# Patient Record
Sex: MALE | Race: WHITE | Employment: FULL TIME | URBAN - METROPOLITAN AREA
[De-identification: names, ages, dates, MRNs, and addresses within clinical notes are randomized per-mention and may not be internally consistent; named-entity substitution may affect disease eponyms.]

---

## 2018-01-29 PROBLEM — R03.0 ELEVATED BP WITHOUT DIAGNOSIS OF HYPERTENSION: Status: ACTIVE | Noted: 2018-01-29

## 2018-10-10 ENCOUNTER — NURSE ONLY (OUTPATIENT)
Dept: FAMILY MEDICINE CLINIC | Age: 43
End: 2018-10-10
Payer: COMMERCIAL

## 2018-10-10 DIAGNOSIS — Z23 NEED FOR TDAP VACCINATION: Primary | ICD-10-CM

## 2018-10-10 PROCEDURE — 90715 TDAP VACCINE 7 YRS/> IM: CPT | Performed by: FAMILY MEDICINE

## 2018-10-10 PROCEDURE — 90471 IMMUNIZATION ADMIN: CPT | Performed by: FAMILY MEDICINE

## 2019-03-04 ENCOUNTER — HOSPITAL ENCOUNTER (OUTPATIENT)
Age: 44
Discharge: HOME | End: 2019-03-06
Payer: COMMERCIAL

## 2019-03-04 ENCOUNTER — OFFICE VISIT (OUTPATIENT)
Dept: FAMILY MEDICINE CLINIC | Age: 44
End: 2019-03-04
Payer: COMMERCIAL

## 2019-03-04 VITALS
TEMPERATURE: 99.1 F | RESPIRATION RATE: 16 BRPM | HEIGHT: 69 IN | WEIGHT: 218.2 LBS | SYSTOLIC BLOOD PRESSURE: 139 MMHG | BODY MASS INDEX: 32.32 KG/M2 | HEART RATE: 73 BPM | OXYGEN SATURATION: 98 % | DIASTOLIC BLOOD PRESSURE: 87 MMHG

## 2019-03-04 DIAGNOSIS — R07.9 CHEST PAIN, UNSPECIFIED TYPE: ICD-10-CM

## 2019-03-04 DIAGNOSIS — Z00.00 ANNUAL PHYSICAL EXAM: Primary | ICD-10-CM

## 2019-03-04 DIAGNOSIS — M1A.09X0 IDIOPATHIC CHRONIC GOUT OF MULTIPLE SITES WITHOUT TOPHUS: ICD-10-CM

## 2019-03-04 DIAGNOSIS — Z00.00 ANNUAL PHYSICAL EXAM: ICD-10-CM

## 2019-03-04 LAB
ALBUMIN SERPL-MCNC: 4.6 G/DL (ref 3.5–5.2)
ALP BLD-CCNC: 75 U/L (ref 40–129)
ALT SERPL-CCNC: 18 U/L (ref 0–40)
ANION GAP SERPL CALCULATED.3IONS-SCNC: 14 MMOL/L (ref 7–16)
AST SERPL-CCNC: 22 U/L (ref 0–39)
BASOPHILS ABSOLUTE: 0.04 E9/L (ref 0–0.2)
BASOPHILS RELATIVE PERCENT: 0.7 % (ref 0–2)
BILIRUB SERPL-MCNC: 0.3 MG/DL (ref 0–1.2)
BUN BLDV-MCNC: 13 MG/DL (ref 6–20)
CALCIUM SERPL-MCNC: 9.3 MG/DL (ref 8.6–10.2)
CHLORIDE BLD-SCNC: 98 MMOL/L (ref 98–107)
CHOLESTEROL, TOTAL: 224 MG/DL (ref 0–199)
CO2: 25 MMOL/L (ref 22–29)
CREAT SERPL-MCNC: 0.9 MG/DL (ref 0.7–1.2)
EOSINOPHILS ABSOLUTE: 0.1 E9/L (ref 0.05–0.5)
EOSINOPHILS RELATIVE PERCENT: 1.8 % (ref 0–6)
GFR AFRICAN AMERICAN: >60
GFR NON-AFRICAN AMERICAN: >60 ML/MIN/1.73
GLUCOSE BLD-MCNC: 87 MG/DL (ref 74–99)
HCT VFR BLD CALC: 44.1 % (ref 37–54)
HDLC SERPL-MCNC: 60 MG/DL
HEMOGLOBIN: 14.2 G/DL (ref 12.5–16.5)
IMMATURE GRANULOCYTES #: 0.02 E9/L
IMMATURE GRANULOCYTES %: 0.4 % (ref 0–5)
LDL CHOLESTEROL CALCULATED: 138 MG/DL (ref 0–99)
LYMPHOCYTES ABSOLUTE: 1.39 E9/L (ref 1.5–4)
LYMPHOCYTES RELATIVE PERCENT: 25.4 % (ref 20–42)
MCH RBC QN AUTO: 31.1 PG (ref 26–35)
MCHC RBC AUTO-ENTMCNC: 32.2 % (ref 32–34.5)
MCV RBC AUTO: 96.7 FL (ref 80–99.9)
MONOCYTES ABSOLUTE: 0.4 E9/L (ref 0.1–0.95)
MONOCYTES RELATIVE PERCENT: 7.3 % (ref 2–12)
NEUTROPHILS ABSOLUTE: 3.53 E9/L (ref 1.8–7.3)
NEUTROPHILS RELATIVE PERCENT: 64.4 % (ref 43–80)
PDW BLD-RTO: 13 FL (ref 11.5–15)
PLATELET # BLD: 169 E9/L (ref 130–450)
PMV BLD AUTO: 11.6 FL (ref 7–12)
POTASSIUM SERPL-SCNC: 4.4 MMOL/L (ref 3.5–5)
RBC # BLD: 4.56 E12/L (ref 3.8–5.8)
SODIUM BLD-SCNC: 137 MMOL/L (ref 132–146)
TOTAL PROTEIN: 7.8 G/DL (ref 6.4–8.3)
TRIGL SERPL-MCNC: 131 MG/DL (ref 0–149)
URIC ACID, SERUM: 9 MG/DL (ref 3.4–7)
VITAMIN D 25-HYDROXY: 29 NG/ML (ref 30–100)
VLDLC SERPL CALC-MCNC: 26 MG/DL
WBC # BLD: 5.5 E9/L (ref 4.5–11.5)

## 2019-03-04 PROCEDURE — 82306 VITAMIN D 25 HYDROXY: CPT

## 2019-03-04 PROCEDURE — 99396 PREV VISIT EST AGE 40-64: CPT | Performed by: FAMILY MEDICINE

## 2019-03-04 PROCEDURE — 85025 COMPLETE CBC W/AUTO DIFF WBC: CPT

## 2019-03-04 PROCEDURE — 80061 LIPID PANEL: CPT

## 2019-03-04 PROCEDURE — 80053 COMPREHEN METABOLIC PANEL: CPT

## 2019-03-04 PROCEDURE — 93000 ELECTROCARDIOGRAM COMPLETE: CPT | Performed by: FAMILY MEDICINE

## 2019-03-04 PROCEDURE — 84550 ASSAY OF BLOOD/URIC ACID: CPT

## 2019-03-04 RX ORDER — ALLOPURINOL 100 MG/1
100 TABLET ORAL DAILY
Qty: 90 TABLET | Refills: 1 | Status: SHIPPED
Start: 2019-03-04 | End: 2020-11-05

## 2019-03-04 RX ORDER — PREDNISONE 20 MG/1
20 TABLET ORAL 2 TIMES DAILY
Qty: 10 TABLET | Refills: 0 | Status: SHIPPED | OUTPATIENT
Start: 2019-03-04 | End: 2019-03-09

## 2019-03-04 ASSESSMENT — PATIENT HEALTH QUESTIONNAIRE - PHQ9
SUM OF ALL RESPONSES TO PHQ9 QUESTIONS 1 & 2: 0
SUM OF ALL RESPONSES TO PHQ QUESTIONS 1-9: 0
1. LITTLE INTEREST OR PLEASURE IN DOING THINGS: 0
SUM OF ALL RESPONSES TO PHQ QUESTIONS 1-9: 0
2. FEELING DOWN, DEPRESSED OR HOPELESS: 0

## 2020-11-05 ENCOUNTER — OFFICE VISIT (OUTPATIENT)
Dept: FAMILY MEDICINE CLINIC | Age: 45
End: 2020-11-05
Payer: COMMERCIAL

## 2020-11-05 VITALS
HEART RATE: 73 BPM | BODY MASS INDEX: 33.33 KG/M2 | RESPIRATION RATE: 16 BRPM | WEIGHT: 225 LBS | OXYGEN SATURATION: 99 % | HEIGHT: 69 IN | TEMPERATURE: 97.6 F | SYSTOLIC BLOOD PRESSURE: 137 MMHG | DIASTOLIC BLOOD PRESSURE: 92 MMHG

## 2020-11-05 DIAGNOSIS — Z00.00 ANNUAL PHYSICAL EXAM: ICD-10-CM

## 2020-11-05 DIAGNOSIS — M1A.00X0 IDIOPATHIC CHRONIC GOUT WITHOUT TOPHUS, UNSPECIFIED SITE: ICD-10-CM

## 2020-11-05 PROCEDURE — 99396 PREV VISIT EST AGE 40-64: CPT | Performed by: FAMILY MEDICINE

## 2020-11-05 PROCEDURE — 90471 IMMUNIZATION ADMIN: CPT | Performed by: FAMILY MEDICINE

## 2020-11-05 PROCEDURE — 90686 IIV4 VACC NO PRSV 0.5 ML IM: CPT | Performed by: FAMILY MEDICINE

## 2020-11-05 SDOH — ECONOMIC STABILITY: TRANSPORTATION INSECURITY
IN THE PAST 12 MONTHS, HAS THE LACK OF TRANSPORTATION KEPT YOU FROM MEDICAL APPOINTMENTS OR FROM GETTING MEDICATIONS?: NO

## 2020-11-05 SDOH — ECONOMIC STABILITY: FOOD INSECURITY: WITHIN THE PAST 12 MONTHS, YOU WORRIED THAT YOUR FOOD WOULD RUN OUT BEFORE YOU GOT MONEY TO BUY MORE.: NEVER TRUE

## 2020-11-05 SDOH — ECONOMIC STABILITY: TRANSPORTATION INSECURITY
IN THE PAST 12 MONTHS, HAS LACK OF TRANSPORTATION KEPT YOU FROM MEETINGS, WORK, OR FROM GETTING THINGS NEEDED FOR DAILY LIVING?: NO

## 2020-11-05 SDOH — ECONOMIC STABILITY: INCOME INSECURITY: HOW HARD IS IT FOR YOU TO PAY FOR THE VERY BASICS LIKE FOOD, HOUSING, MEDICAL CARE, AND HEATING?: NOT HARD AT ALL

## 2020-11-05 ASSESSMENT — PATIENT HEALTH QUESTIONNAIRE - PHQ9
2. FEELING DOWN, DEPRESSED OR HOPELESS: 0
SUM OF ALL RESPONSES TO PHQ9 QUESTIONS 1 & 2: 0
SUM OF ALL RESPONSES TO PHQ QUESTIONS 1-9: 0
SUM OF ALL RESPONSES TO PHQ QUESTIONS 1-9: 0
1. LITTLE INTEREST OR PLEASURE IN DOING THINGS: 0
SUM OF ALL RESPONSES TO PHQ QUESTIONS 1-9: 0

## 2020-11-05 NOTE — PROGRESS NOTES
Annual exam:  Patient is here for routine yearly physical/preventative visit. Patient has no complaints or concerns today. Patient is  generally healthy. Chronic medical conditions are generally controlled. Health maintenance reviewed with patient and is  up to date. Overall doing well.       Past Medical History:   Diagnosis Date    Gout 1998      Past Surgical History:   Procedure Laterality Date    FINGER SURGERY Left 09/15/2017    left ring finger mass excision    LUMBAR FUSION      bone graft also--[facility] Dr. Zechariah Chang        Family History   Problem Relation Age of Onset    Heart Disease Father         massive MI    Cancer Maternal Grandmother         breast and lung    Cancer Maternal Grandfather     Diabetes Paternal Grandmother     Cancer Paternal Grandmother         breast    Heart Disease Paternal Grandfather     Heart Disease Paternal Aunt 72         of massive MI    Diabetes Paternal Aunt     Cancer Maternal Uncle         colon     Social History     Socioeconomic History    Marital status:      Spouse name: Not on file    Number of children: Not on file    Years of education: Not on file    Highest education level: Not on file   Occupational History    Not on file   Social Needs    Financial resource strain: Not on file    Food insecurity     Worry: Not on file     Inability: Not on file    Transportation needs     Medical: Not on file     Non-medical: Not on file   Tobacco Use    Smoking status: Former Smoker     Years: 10.00     Types: Cigarettes     Start date: 2000    Smokeless tobacco: Former User   Substance and Sexual Activity    Alcohol use: Yes     Comment: 2-3 drinks daily    Drug use: No    Sexual activity: Not on file   Lifestyle    Physical activity     Days per week: Not on file     Minutes per session: Not on file    Stress: Not on file   Relationships    Social connections     Talks on phone: Not on file Gets together: Not on file     Attends Buddhist service: Not on file     Active member of club or organization: Not on file     Attends meetings of clubs or organizations: Not on file     Relationship status: Not on file    Intimate partner violence     Fear of current or ex partner: Not on file     Emotionally abused: Not on file     Physically abused: Not on file     Forced sexual activity: Not on file   Other Topics Concern    Not on file   Social History Narrative    Not on file      No Known Allergies     Review of Systems:  Constitutional:  No fever, no fatigue, no chills, no headaches, no weight change  Dermatology:  No rash, no mole, no dry or sensitive skin  ENT:  No cough, no sore throat, no sinus pain, no runny nose, no ear pain  Cardiology:  No chest pain, no palpitations, no leg edema, no shortness of breath, no PND  Endocrinology:  No polydipsia, no polyuria, no cold intolerance, no heat intolerance, no polyphagia, no hair changes  Gastroenterology:  No dysphagia, no abdominal pain, no nausea, no vomiting, no constipation, no diarrhea, no heartburn  Female Reproductive:  No hot flashes, no abnormal vaginal discharge, no pain with menstruation, no pelvic pain  Musculoskeletal:  No joint pain, no leg cramps, no back pain, no muscle aches  Respiratory:  No shortness of breath, no orthopnea, no wheezing, no SANCHEZ, no hemoptysis  Urology:  No blood in the urine, no urinary frequency, no urinary incontinence, no urinary urgency, no nocturia, no dysuria  Neurology:  No numbness/tingling, no dizziness, no weakness  Psychology:  No depression, no sleep disturbances, no suicidal ideation, no anxiety  Physical Exam:  Vitals:    11/05/20 0834 11/05/20 0840   BP: (!) 142/86 (!) 137/92   Pulse: 73    Resp: 16    Temp: 97.6 °F (36.4 °C)    TempSrc: Temporal    SpO2: 99%    Weight: 225 lb (102.1 kg)    Height: 5' 9\" (1.753 m)      General:  Patient alert and oriented x 3, NAD, pleasant  HEENT:  Atraumatic, normocephalic, PERRLA, EOMI, clear conjunctiva, TMs clear, nose-clear, throat - no erythema, tonsils- wnl  Neck:  Supple, no goiter, no carotid bruits, no lymphadenopathy  Lungs:  CTA B  Heart:  RRR, no murmurs, gallops or rubs  Abdomen:  Soft, NTND, + bowel sounds  Back: full ROM, no CVA tenderness  Extremities:  No clubbing, cyanosis or edema  Neuro:  CN II-XII grossly intact, 5/5 strength in bilateral upper and lower extremities, 2 + reflexes. Skin: unremarkable    Assessment/Plan:  Fidel Ibarra was seen today for annual exam.    Diagnoses and all orders for this visit:    Annual physical exam  -     Comprehensive Metabolic Panel; Future  -     CBC Auto Differential; Future  -     Lipid Panel; Future  -     TSH without Reflex; Future  -     Vitamin D 25 Hydroxy; Future    Idiopathic chronic gout without tophus, unspecified site  -     Uric Acid; Future    Other orders  -     INFLUENZA, QUADV, 3 YRS AND OLDER, IM PF, PREFILL SYR OR SDV, 0.5ML (AFLURIA QUADV, PF)      As above. Call or go to ED immediately if symptoms worsen or persist.  No follow-ups on file. or sooner if necessary. Educational materials and/or home exercises printed for patient's review and were included in patient instructions on his/her After Visit Summary and given to patient at the end of visit. Counseled regarding above diagnosis, including possible risks and complications,  especially if left uncontrolled. Counseled regarding the possible side effects, risks, benefits and alternatives to treatment; patient and/or guardian verbalizes understanding, agrees, feels comfortable with and wishes to proceed with above treatment plan. Advised patient to call with any new medication issues, and read all Rx info from pharmacy to assure aware of all possible risks and side effects of medication before taking. Reviewed age and gender appropriate health screening exams and vaccinations.   Advised patient regarding importance of keeping up with recommended health maintenance and to schedule as soon as possible if overdue, as this is important in assessing for undiagnosed pathology, especially cancer, as well as protecting against potentially harmful/life threatening disease. Patient and/or guardian verbalizes understanding and agrees with above counseling, assessment and plan. All questions answered. Jalil Masterson MD  11/5/2020    I have personally reviewed and updated the chief complaint, HPI, Past Medical, Family and Social History, as well as the above Review of Systems.

## 2020-11-06 LAB
ALBUMIN SERPL-MCNC: 4.4 G/DL (ref 3.5–5.2)
ALP BLD-CCNC: 74 U/L (ref 40–129)
ALT SERPL-CCNC: 12 U/L (ref 0–40)
ANION GAP SERPL CALCULATED.3IONS-SCNC: 13 MMOL/L (ref 7–16)
AST SERPL-CCNC: 17 U/L (ref 0–39)
BASOPHILS ABSOLUTE: 0.03 E9/L (ref 0–0.2)
BASOPHILS RELATIVE PERCENT: 0.6 % (ref 0–2)
BILIRUB SERPL-MCNC: 0.3 MG/DL (ref 0–1.2)
BUN BLDV-MCNC: 11 MG/DL (ref 6–20)
CALCIUM SERPL-MCNC: 9.2 MG/DL (ref 8.6–10.2)
CHLORIDE BLD-SCNC: 102 MMOL/L (ref 98–107)
CHOLESTEROL, TOTAL: 208 MG/DL (ref 0–199)
CO2: 24 MMOL/L (ref 22–29)
CREAT SERPL-MCNC: 0.9 MG/DL (ref 0.7–1.2)
EOSINOPHILS ABSOLUTE: 0.07 E9/L (ref 0.05–0.5)
EOSINOPHILS RELATIVE PERCENT: 1.3 % (ref 0–6)
GFR AFRICAN AMERICAN: >60
GFR NON-AFRICAN AMERICAN: >60 ML/MIN/1.73
GLUCOSE BLD-MCNC: 103 MG/DL (ref 74–99)
HCT VFR BLD CALC: 45.4 % (ref 37–54)
HDLC SERPL-MCNC: 54 MG/DL
HEMOGLOBIN: 14.1 G/DL (ref 12.5–16.5)
IMMATURE GRANULOCYTES #: 0.03 E9/L
IMMATURE GRANULOCYTES %: 0.6 % (ref 0–5)
LDL CHOLESTEROL CALCULATED: 140 MG/DL (ref 0–99)
LYMPHOCYTES ABSOLUTE: 1.26 E9/L (ref 1.5–4)
LYMPHOCYTES RELATIVE PERCENT: 23.5 % (ref 20–42)
MCH RBC QN AUTO: 31.5 PG (ref 26–35)
MCHC RBC AUTO-ENTMCNC: 31.1 % (ref 32–34.5)
MCV RBC AUTO: 101.3 FL (ref 80–99.9)
MONOCYTES ABSOLUTE: 0.39 E9/L (ref 0.1–0.95)
MONOCYTES RELATIVE PERCENT: 7.3 % (ref 2–12)
NEUTROPHILS ABSOLUTE: 3.59 E9/L (ref 1.8–7.3)
NEUTROPHILS RELATIVE PERCENT: 66.7 % (ref 43–80)
PDW BLD-RTO: 13.4 FL (ref 11.5–15)
PLATELET # BLD: 164 E9/L (ref 130–450)
PMV BLD AUTO: 11.3 FL (ref 7–12)
POTASSIUM SERPL-SCNC: 4.6 MMOL/L (ref 3.5–5)
RBC # BLD: 4.48 E12/L (ref 3.8–5.8)
SODIUM BLD-SCNC: 139 MMOL/L (ref 132–146)
TOTAL PROTEIN: 7.4 G/DL (ref 6.4–8.3)
TRIGL SERPL-MCNC: 71 MG/DL (ref 0–149)
TSH SERPL DL<=0.05 MIU/L-ACNC: 1.92 UIU/ML (ref 0.27–4.2)
URIC ACID, SERUM: 8.4 MG/DL (ref 3.4–7)
VITAMIN D 25-HYDROXY: 21 NG/ML (ref 30–100)
VLDLC SERPL CALC-MCNC: 14 MG/DL
WBC # BLD: 5.4 E9/L (ref 4.5–11.5)

## 2020-12-04 RX ORDER — PREDNISONE 10 MG/1
TABLET ORAL
Qty: 20 TABLET | Refills: 1 | Status: SHIPPED
Start: 2020-12-04 | End: 2021-05-07

## 2020-12-07 ENCOUNTER — TELEPHONE (OUTPATIENT)
Dept: FAMILY MEDICINE CLINIC | Age: 45
End: 2020-12-07

## 2020-12-08 RX ORDER — ALLOPURINOL 100 MG/1
100 TABLET ORAL DAILY
Qty: 90 TABLET | Refills: 1 | Status: SHIPPED
Start: 2020-12-08 | End: 2021-01-26 | Stop reason: SDUPTHER

## 2020-12-08 NOTE — TELEPHONE ENCOUNTER
Left message notifying patient that Rx was sent to pharmacy and advising to start while taking Prednisone

## 2021-01-21 ENCOUNTER — TELEPHONE (OUTPATIENT)
Dept: ADMINISTRATIVE | Age: 46
End: 2021-01-21

## 2021-01-21 NOTE — TELEPHONE ENCOUNTER
Pt states he left a message on the clinical staff voicemail in the office, but it cut him off, so he wanted to make sure you got the whole message. This is his third flare up of gout in his knee in the past 6-8 weeks. He would like some blood work or an appt to figure out why he is getting flare up's so often. Next in office appt is not until the 28th. Please contact pt with further instructions/appt.

## 2021-01-26 ENCOUNTER — VIRTUAL VISIT (OUTPATIENT)
Dept: FAMILY MEDICINE CLINIC | Age: 46
End: 2021-01-26
Payer: COMMERCIAL

## 2021-01-26 DIAGNOSIS — M1A.00X0 IDIOPATHIC CHRONIC GOUT WITHOUT TOPHUS, UNSPECIFIED SITE: Primary | ICD-10-CM

## 2021-01-26 PROCEDURE — 99213 OFFICE O/P EST LOW 20 MIN: CPT | Performed by: FAMILY MEDICINE

## 2021-01-26 RX ORDER — ALLOPURINOL 300 MG/1
300 TABLET ORAL DAILY
Qty: 90 TABLET | Refills: 1 | Status: SHIPPED
Start: 2021-01-26 | End: 2021-05-07 | Stop reason: SDUPTHER

## 2021-01-26 ASSESSMENT — PATIENT HEALTH QUESTIONNAIRE - PHQ9
SUM OF ALL RESPONSES TO PHQ9 QUESTIONS 1 & 2: 0
1. LITTLE INTEREST OR PLEASURE IN DOING THINGS: 0
SUM OF ALL RESPONSES TO PHQ QUESTIONS 1-9: 0

## 2021-01-26 NOTE — PROGRESS NOTES
disturbance, weight gain or weight loss  Psychological ROS: negative for - anxiety, behavioral disorder, depression, hallucinations, irritability, memory difficulties, mood swings, sleep disturbances or suicidal ideation  ENT ROS: negative for - epistaxis, headaches, hearing change, nasal congestion, nasal discharge, nasal polyps, sinus pain, tinnitus, vertigo or visual changes  Hematological and Lymphatic ROS: negative for - bleeding problems, blood clots, fatigue or swollen lymph nodes  Respiratory ROS: negative for - cough, orthopnea, shortness of breath, sputum changes, tachypnea or wheezing  Cardiovascular ROS: negative for - chest pain, dyspnea on exertion, irregular heartbeat, loss of consciousness, palpitations, paroxysmal nocturnal dyspnea or rapid heart rate  Gastrointestinal ROS: negative for - abdominal pain, blood in stools, change in bowel habits, constipation, diarrhea, gas/bloating, heartburn or nausea/vomiting  Musculoskeletal ROS: negative for - joint pain, joint stiffness, joint swelling or muscle, back pain, bowel or bladder incontinence  Neurological ROS: negative for - behavioral changes, confusion, dizziness, headaches, memory loss, numbness/tingling, seizures or speech problems, weakness  Dermatological ROS: negative for - dry skin, mole changes, nail changes, pruritus, rash or skin lesion changes    Prior to Visit Medications    Medication Sig Taking?  Authorizing Provider   allopurinol (ZYLOPRIM) 300 MG tablet Take 1 tablet by mouth daily Yes Tamar Wang MD   predniSONE (DELTASONE) 10 MG tablet 4 tabs x 2 days then 3 tabs x 2 days then 2 tabs x 2 days then 1 tab x 2 days Yes Clarisse Law MD       Social History     Tobacco Use    Smoking status: Former Smoker     Years: 10.00     Types: Cigarettes     Start date: 9/27/2000    Smokeless tobacco: Former User   Substance Use Topics    Alcohol use: Yes     Comment: 2-3 drinks daily    Drug use: No        No Known Allergies,   Past Medical History:   Diagnosis Date    Gout    ,   Past Surgical History:   Procedure Laterality Date    FINGER SURGERY Left 09/15/2017    left ring finger mass excision    LUMBAR FUSION      bone graft also--[facility] Dr. Sonia Gabriel  2005   ,   Social History     Tobacco Use    Smoking status: Former Smoker     Years: 10.00     Types: Cigarettes     Start date: 2000    Smokeless tobacco: Former User   Substance Use Topics    Alcohol use: Yes     Comment: 2-3 drinks daily    Drug use: No   ,   Family History   Problem Relation Age of Onset    Heart Disease Father         massive MI    Cancer Maternal Grandmother         breast and lung    Cancer Maternal Grandfather     Diabetes Paternal Grandmother     Cancer Paternal Grandmother         breast    Heart Disease Paternal Grandfather     Heart Disease Paternal Aunt 79         of massive MI    Diabetes Paternal Aunt     Cancer Maternal Uncle         colon   ,   Immunization History   Administered Date(s) Administered    Influenza, Quadv, IM, PF (6 mo and older Fluzone, Flulaval, Fluarix, and 3 yrs and older Afluria) 2020    Tdap (Boostrix, Adacel) 10/10/2018   ,   Health Maintenance   Topic Date Due    Hepatitis C screen  1975    HIV screen  1990    A1C test (Diabetic or Prediabetic)  2017    Lipid screen  2025    DTaP/Tdap/Td vaccine (2 - Td) 10/10/2028    Flu vaccine  Completed    Hepatitis A vaccine  Aged Out    Hepatitis B vaccine  Aged Out    Hib vaccine  Aged Out    Meningococcal (ACWY) vaccine  Aged Out    Pneumococcal 0-64 years Vaccine  Aged Out       PHYSICAL EXAMINATION:  [ INSTRUCTIONS:  \"[x]\" Indicates a positive item  \"[]\" Indicates a negative item  -- DELETE ALL ITEMS NOT EXAMINED]  Vital Signs: (As obtained by patient/caregiver or practitioner observation)    Blood pressure-  Heart rate-    Respiratory rate-    Temperature-  Pulse oximetry-     Constitutional: [x] Appears well-developed and well-nourished [] No apparent distress      [x] Abnormal-   Mental status  [x] Alert and awake  [x] Oriented to person/place/time [x]Able to follow commands      Eyes:  EOM    [x]  Normal  [] Abnormal-  Sclera  [x]  Normal  [] Abnormal -         Discharge [x]  None visible  [] Abnormal -    HENT:   [x] Normocephalic, atraumatic. [] Abnormal   [x] Mouth/Throat: Mucous membranes are moist.     External Ears [x] Normal  [] Abnormal-     Neck: [x] No visualized mass     Pulmonary/Chest: [x] Respiratory effort normal.  [x] No visualized signs of difficulty breathing or respiratory distress        [] Abnormal-      Musculoskeletal:   [] Normal gait with no signs of ataxia         [x] Normal range of motion of neck        [] Abnormal-       Neurological:        [x] No Facial Asymmetry (Cranial nerve 7 motor function) (limited exam to video visit)          [] No gaze palsy        [] Abnormal-         Skin:        [x] No significant exanthematous lesions or discoloration noted on facial skin         [] Abnormal-            Psychiatric:       [x] Normal Affect [x] No Hallucinations        [] Abnormal-       Other pertinent observable physical exam findings-     Due to this being a TeleHealth encounter, evaluation of the following organ systems is limited: Vitals/Constitutional/EENT/Resp/CV/GI//MS/Neuro/Skin/Heme-Lymph-Imm. ASSESSMENT/PLAN:  Delores Freeman was seen today for gout. Diagnoses and all orders for this visit:    Idiopathic chronic gout without tophus, unspecified site  -     Uric Acid; Future    Other orders  -     allopurinol (ZYLOPRIM) 300 MG tablet; Take 1 tablet by mouth daily        No follow-ups on file. An  electronic signature was used to authenticate this note.     --Janette Neville MD on 1/26/2021 at 2:38 }    Pursuant to the emergency declaration under the [facility] waiver authority and the [facility] and Response Supplemental Appropriations Act, this Virtual  Visit was conducted, with patient's consent, to reduce the patient's risk of exposure to COVID-19 and provide continuity of care for an established patient. Services were provided through a video synchronous discussion virtually to substitute for in-person clinic visit.

## 2021-02-16 ENCOUNTER — NURSE ONLY (OUTPATIENT)
Dept: FAMILY MEDICINE CLINIC | Age: 46
End: 2021-02-16
Payer: COMMERCIAL

## 2021-02-16 DIAGNOSIS — E78.1 HYPERTRIGLYCERIDEMIA: ICD-10-CM

## 2021-02-16 DIAGNOSIS — M1A.00X0 IDIOPATHIC CHRONIC GOUT WITHOUT TOPHUS, UNSPECIFIED SITE: ICD-10-CM

## 2021-02-16 DIAGNOSIS — R73.01 IMPAIRED FASTING GLUCOSE: ICD-10-CM

## 2021-02-16 LAB — URIC ACID, SERUM: 6.8 MG/DL (ref 3.4–7)

## 2021-02-16 PROCEDURE — 36415 COLL VENOUS BLD VENIPUNCTURE: CPT | Performed by: FAMILY MEDICINE

## 2021-02-17 RX ORDER — ALLOPURINOL 100 MG/1
100 TABLET ORAL DAILY
Qty: 30 TABLET | Refills: 1 | Status: SHIPPED
Start: 2021-02-17 | End: 2021-05-07 | Stop reason: SDUPTHER

## 2021-05-04 ENCOUNTER — NURSE ONLY (OUTPATIENT)
Dept: FAMILY MEDICINE CLINIC | Age: 46
End: 2021-05-04
Payer: COMMERCIAL

## 2021-05-04 DIAGNOSIS — E78.1 HYPERTRIGLYCERIDEMIA: ICD-10-CM

## 2021-05-04 DIAGNOSIS — R73.01 IMPAIRED FASTING GLUCOSE: Primary | ICD-10-CM

## 2021-05-04 LAB
ALBUMIN SERPL-MCNC: 4.2 G/DL (ref 3.5–5.2)
ALP BLD-CCNC: 65 U/L (ref 40–129)
ALT SERPL-CCNC: 10 U/L (ref 0–40)
ANION GAP SERPL CALCULATED.3IONS-SCNC: 11 MMOL/L (ref 7–16)
AST SERPL-CCNC: 19 U/L (ref 0–39)
BASOPHILS ABSOLUTE: 0.03 E9/L (ref 0–0.2)
BASOPHILS RELATIVE PERCENT: 0.6 % (ref 0–2)
BILIRUB SERPL-MCNC: 0.4 MG/DL (ref 0–1.2)
BUN BLDV-MCNC: 12 MG/DL (ref 6–20)
CALCIUM SERPL-MCNC: 9.2 MG/DL (ref 8.6–10.2)
CHLORIDE BLD-SCNC: 104 MMOL/L (ref 98–107)
CHOLESTEROL, TOTAL: 189 MG/DL (ref 0–199)
CO2: 25 MMOL/L (ref 22–29)
CREAT SERPL-MCNC: 0.9 MG/DL (ref 0.7–1.2)
EOSINOPHILS ABSOLUTE: 0.1 E9/L (ref 0.05–0.5)
EOSINOPHILS RELATIVE PERCENT: 2 % (ref 0–6)
GFR AFRICAN AMERICAN: >60
GFR NON-AFRICAN AMERICAN: >60 ML/MIN/1.73
GLUCOSE BLD-MCNC: 109 MG/DL (ref 74–99)
HCT VFR BLD CALC: 42.9 % (ref 37–54)
HDLC SERPL-MCNC: 50 MG/DL
HEMOGLOBIN: 13.7 G/DL (ref 12.5–16.5)
IMMATURE GRANULOCYTES #: 0.03 E9/L
IMMATURE GRANULOCYTES %: 0.6 % (ref 0–5)
LDL CHOLESTEROL CALCULATED: 102 MG/DL (ref 0–99)
LYMPHOCYTES ABSOLUTE: 1.31 E9/L (ref 1.5–4)
LYMPHOCYTES RELATIVE PERCENT: 25.8 % (ref 20–42)
MCH RBC QN AUTO: 31.9 PG (ref 26–35)
MCHC RBC AUTO-ENTMCNC: 31.9 % (ref 32–34.5)
MCV RBC AUTO: 99.8 FL (ref 80–99.9)
MONOCYTES ABSOLUTE: 0.42 E9/L (ref 0.1–0.95)
MONOCYTES RELATIVE PERCENT: 8.3 % (ref 2–12)
NEUTROPHILS ABSOLUTE: 3.18 E9/L (ref 1.8–7.3)
NEUTROPHILS RELATIVE PERCENT: 62.7 % (ref 43–80)
PDW BLD-RTO: 13.9 FL (ref 11.5–15)
PLATELET # BLD: 148 E9/L (ref 130–450)
PMV BLD AUTO: 11.6 FL (ref 7–12)
POTASSIUM SERPL-SCNC: 4.2 MMOL/L (ref 3.5–5)
RBC # BLD: 4.3 E12/L (ref 3.8–5.8)
SODIUM BLD-SCNC: 140 MMOL/L (ref 132–146)
TOTAL PROTEIN: 6.9 G/DL (ref 6.4–8.3)
TRIGL SERPL-MCNC: 185 MG/DL (ref 0–149)
TSH SERPL DL<=0.05 MIU/L-ACNC: 2.19 UIU/ML (ref 0.27–4.2)
VLDLC SERPL CALC-MCNC: 37 MG/DL
WBC # BLD: 5.1 E9/L (ref 4.5–11.5)

## 2021-05-04 PROCEDURE — 36415 COLL VENOUS BLD VENIPUNCTURE: CPT | Performed by: FAMILY MEDICINE

## 2021-05-07 ENCOUNTER — OFFICE VISIT (OUTPATIENT)
Dept: FAMILY MEDICINE CLINIC | Age: 46
End: 2021-05-07
Payer: COMMERCIAL

## 2021-05-07 VITALS
SYSTOLIC BLOOD PRESSURE: 127 MMHG | RESPIRATION RATE: 16 BRPM | DIASTOLIC BLOOD PRESSURE: 83 MMHG | WEIGHT: 216.4 LBS | HEART RATE: 62 BPM | TEMPERATURE: 97.2 F | HEIGHT: 69 IN | OXYGEN SATURATION: 98 % | BODY MASS INDEX: 32.05 KG/M2

## 2021-05-07 DIAGNOSIS — E78.1 HYPERTRIGLYCERIDEMIA: ICD-10-CM

## 2021-05-07 DIAGNOSIS — R73.01 IMPAIRED FASTING GLUCOSE: ICD-10-CM

## 2021-05-07 DIAGNOSIS — M1A.00X0 IDIOPATHIC CHRONIC GOUT WITHOUT TOPHUS, UNSPECIFIED SITE: Primary | ICD-10-CM

## 2021-05-07 LAB
HBA1C MFR BLD: 5.3 %
URIC ACID, SERUM: 4.1 MG/DL (ref 3.4–7)

## 2021-05-07 PROCEDURE — 83036 HEMOGLOBIN GLYCOSYLATED A1C: CPT | Performed by: FAMILY MEDICINE

## 2021-05-07 PROCEDURE — 99214 OFFICE O/P EST MOD 30 MIN: CPT | Performed by: FAMILY MEDICINE

## 2021-05-07 RX ORDER — ALLOPURINOL 300 MG/1
300 TABLET ORAL DAILY
Qty: 90 TABLET | Refills: 3 | Status: SHIPPED
Start: 2021-05-07 | End: 2022-09-22

## 2021-05-07 RX ORDER — ALLOPURINOL 100 MG/1
200 TABLET ORAL DAILY
Qty: 180 TABLET | Refills: 3 | Status: SHIPPED
Start: 2021-05-07 | End: 2022-09-22

## 2021-05-07 NOTE — PROGRESS NOTES
Chief Complaint   Patient presents with    6 Month Follow-Up    Discuss Labs       HPI:  Patient is here for 6 month follow-up and to discuss lab work. Patient complains of still getting flare ups of gout. Hyperlipidemia:  Patient is here to follow up regarding chronic hyperlipidemia. This is  generally controlled. Treatment includes diet. Patient is  compliant with lifestyle modifications. Patient is not a smoker. Most recent labs reviewed with patient today and are not remarkable. Comorbid conditions include obesity but has lost 15 lbs with diet modification. BP is better as well. Lab Results   Component Value Date    LDLCALC 102 (H) 05/04/2021       Patient's past medical, surgical, social and/or family history reviewed, updated in chart, and are non-contributory (unless otherwise stated). Medications and allergies also reviewed and updated in chart. Patient's past medical, surgical, social and/or family history reviewed, updated in chart, and are non-contributory (unless otherwise stated). Medications and allergies also reviewed and updated in chart.     Review of Systems:  Constitutional:  No fever, no fatigue, no chills, no headaches, no weight change  Dermatology:  No rash, no mole, no dry or sensitive skin  ENT:  No cough, no sore throat, no sinus pain, no runny nose, no ear pain  Cardiology:  No chest pain, no palpitations, no leg edema, no shortness of breath, no PND  Gastroenterology:  No dysphagia, no abdominal pain, no nausea, no vomiting, no constipation, no diarrhea, no heartburn  Musculoskeletal:  No joint pain, no leg cramps, no back pain, no muscle aches  Respiratory:  No shortness of breath, no orthopnea, no wheezing, no SANCHEZ, no hemoptysis  Urology:  No blood in the urine, no urinary frequency, no urinary incontinence, no urinary urgency, no nocturia, no dysuria  Vitals:    05/07/21 0807   BP: 127/83   Pulse: 62   Resp: 16   Temp: 97.2 °F (36.2 °C)   TempSrc: to schedule as soon as possible if overdue, as this is important in assessing for undiagnosed pathology, especially cancer, as well as protecting against potentially harmful/life threatening disease. Patient and/or guardian verbalizes understanding and agrees with above counseling, assessment and plan. All questions answered. Yunior Terry MD  5/7/2021    I have personally reviewed and updated the chief complaint, HPI, Past Medical, Family and Social History, as well as the above Review of Systems.

## 2021-06-30 ENCOUNTER — NURSE ONLY (OUTPATIENT)
Dept: PRIMARY CARE CLINIC | Age: 46
End: 2021-06-30
Payer: COMMERCIAL

## 2021-06-30 DIAGNOSIS — Z11.52 ENCOUNTER FOR SCREENING FOR COVID-19: Primary | ICD-10-CM

## 2021-06-30 LAB
Lab: NORMAL
PERFORMING INSTRUMENT: NORMAL
QC PASS/FAIL: NORMAL
SARS-COV-2, POC: NORMAL

## 2021-06-30 PROCEDURE — 87426 SARSCOV CORONAVIRUS AG IA: CPT | Performed by: NURSE PRACTITIONER

## 2021-09-17 ENCOUNTER — HOSPITAL ENCOUNTER (EMERGENCY)
Age: 46
Discharge: HOME | End: 2021-09-18
Attending: STUDENT IN AN ORGANIZED HEALTH CARE EDUCATION/TRAINING PROGRAM
Payer: COMMERCIAL

## 2021-09-17 ENCOUNTER — APPOINTMENT (OUTPATIENT)
Dept: GENERAL RADIOLOGY | Age: 46
End: 2021-09-17
Payer: COMMERCIAL

## 2021-09-17 DIAGNOSIS — R07.89 ATYPICAL CHEST PAIN: Primary | ICD-10-CM

## 2021-09-17 DIAGNOSIS — R10.13 DYSPEPSIA: ICD-10-CM

## 2021-09-17 LAB
ALBUMIN SERPL-MCNC: 4.3 G/DL (ref 3.5–5.2)
ALP BLD-CCNC: 81 U/L (ref 40–129)
ALT SERPL-CCNC: 15 U/L (ref 0–40)
ANION GAP SERPL CALCULATED.3IONS-SCNC: 12 MMOL/L (ref 7–16)
AST SERPL-CCNC: 17 U/L (ref 0–39)
BASOPHILS ABSOLUTE: 0.03 E9/L (ref 0–0.2)
BASOPHILS RELATIVE PERCENT: 0.5 % (ref 0–2)
BILIRUB SERPL-MCNC: 0.4 MG/DL (ref 0–1.2)
BUN BLDV-MCNC: 11 MG/DL (ref 6–20)
CALCIUM SERPL-MCNC: 9.4 MG/DL (ref 8.6–10.2)
CHLORIDE BLD-SCNC: 104 MMOL/L (ref 98–107)
CO2: 24 MMOL/L (ref 22–29)
CREAT SERPL-MCNC: 1 MG/DL (ref 0.7–1.2)
EOSINOPHILS ABSOLUTE: 0.05 E9/L (ref 0.05–0.5)
EOSINOPHILS RELATIVE PERCENT: 0.8 % (ref 0–6)
GFR AFRICAN AMERICAN: >60
GFR NON-AFRICAN AMERICAN: >60 ML/MIN/1.73
GLUCOSE BLD-MCNC: 108 MG/DL (ref 74–99)
HCT VFR BLD CALC: 38.6 % (ref 37–54)
HEMOGLOBIN: 13.3 G/DL (ref 12.5–16.5)
IMMATURE GRANULOCYTES #: 0.04 E9/L
IMMATURE GRANULOCYTES %: 0.6 % (ref 0–5)
LYMPHOCYTES ABSOLUTE: 1.56 E9/L (ref 1.5–4)
LYMPHOCYTES RELATIVE PERCENT: 25.3 % (ref 20–42)
MCH RBC QN AUTO: 32.6 PG (ref 26–35)
MCHC RBC AUTO-ENTMCNC: 34.5 % (ref 32–34.5)
MCV RBC AUTO: 94.6 FL (ref 80–99.9)
MONOCYTES ABSOLUTE: 0.58 E9/L (ref 0.1–0.95)
MONOCYTES RELATIVE PERCENT: 9.4 % (ref 2–12)
NEUTROPHILS ABSOLUTE: 3.91 E9/L (ref 1.8–7.3)
NEUTROPHILS RELATIVE PERCENT: 63.4 % (ref 43–80)
PDW BLD-RTO: 12.8 FL (ref 11.5–15)
PLATELET # BLD: 145 E9/L (ref 130–450)
PMV BLD AUTO: 10.8 FL (ref 7–12)
POTASSIUM REFLEX MAGNESIUM: 3.8 MMOL/L (ref 3.5–5)
RBC # BLD: 4.08 E12/L (ref 3.8–5.8)
SODIUM BLD-SCNC: 140 MMOL/L (ref 132–146)
TOTAL PROTEIN: 7.4 G/DL (ref 6.4–8.3)
TROPONIN, HIGH SENSITIVITY: <6 NG/L (ref 0–11)
WBC # BLD: 6.2 E9/L (ref 4.5–11.5)

## 2021-09-17 PROCEDURE — 85025 COMPLETE CBC W/AUTO DIFF WBC: CPT

## 2021-09-17 PROCEDURE — 99284 EMERGENCY DEPT VISIT MOD MDM: CPT

## 2021-09-17 PROCEDURE — 84484 ASSAY OF TROPONIN QUANT: CPT

## 2021-09-17 PROCEDURE — 93005 ELECTROCARDIOGRAM TRACING: CPT | Performed by: PHYSICIAN ASSISTANT

## 2021-09-17 PROCEDURE — 80053 COMPREHEN METABOLIC PANEL: CPT

## 2021-09-17 PROCEDURE — 71046 X-RAY EXAM CHEST 2 VIEWS: CPT

## 2021-09-17 RX ORDER — ASPIRIN 81 MG/1
81 TABLET, CHEWABLE ORAL ONCE
Status: COMPLETED | OUTPATIENT
Start: 2021-09-18 | End: 2021-09-18

## 2021-09-17 ASSESSMENT — PAIN SCALES - GENERAL: PAINLEVEL_OUTOF10: 0

## 2021-09-17 ASSESSMENT — PAIN DESCRIPTION - FREQUENCY: FREQUENCY: INTERMITTENT

## 2021-09-17 NOTE — ED NOTES
[address] [facility] CONTACT ASSESSMENT NOTE                                                                                                Department of Emergency Medicine                                                      First Provider Note  21  6:08 PM EDT  NAME: Sarah Beth Harman  : 1975  MRN: [id number]    Chief Complaint: Chest Pain (Radiates Neck/Jaw and Back x3 Days) and Shortness of Breath      History of Present Illness:   Sarah Beth Harman is a 39 y.o. male who presents to the ED for Chest pain      Focused Physical Exam:  VS:    ED Triage Vitals   BP Temp Temp Source Pulse Resp SpO2 Height Weight   21 1805 21 1805 21 1805 21 1805 21 1803 21 1803 21 1803 09/17/21 1803   (!) 161/95 97 °F (36.1 °C) Temporal 102 18 97 % 5' 9\" (1.753 m) 215 lb (97.5 kg)        General: Alert and in no apparent distress. Heart rrr   Lungs clear     Medical History:  has a past medical history of Gout. Surgical History:  has a past surgical history that includes lumbar fusion (); Vasectomy (); and Finger surgery (Left, 09/15/2017). Social History:  reports that he has quit smoking. His smoking use included cigarettes. He started smoking about 20 years ago. He quit after 10.00 years of use. He has quit using smokeless tobacco. He reports current alcohol use. He reports that he does not use drugs. Family History: family history includes Cancer in his maternal grandfather, maternal grandmother, maternal uncle, and paternal grandmother; Diabetes in his paternal aunt and paternal grandmother; Heart Disease in his father and paternal grandfather; Heart Disease (age of onset: 79) in his paternal aunt. Allergies: Patient has no known allergies.      Initial Plan of Care:  Initiate Treatment-Testing, Proceed toTreatment Area When Bed Available for ED Attending/MLP to Continue Care    -------------------------------------------------END OF FIRST PROVIDER CONTACT ASSESSMENT NOTE--------------------------------------------------------  Electronically signed by AZUCENA Carnes   DD: 9/17/21     AZUCENA Carnes  09/17/21 3083

## 2021-09-18 VITALS
HEIGHT: 69 IN | DIASTOLIC BLOOD PRESSURE: 83 MMHG | HEART RATE: 64 BPM | WEIGHT: 215 LBS | OXYGEN SATURATION: 98 % | SYSTOLIC BLOOD PRESSURE: 124 MMHG | BODY MASS INDEX: 31.84 KG/M2 | RESPIRATION RATE: 16 BRPM | TEMPERATURE: 97 F

## 2021-09-18 LAB
D DIMER: <200 NG/ML DDU
EKG ATRIAL RATE: 95 BPM
EKG P AXIS: 52 DEGREES
EKG P-R INTERVAL: 164 MS
EKG Q-T INTERVAL: 352 MS
EKG QRS DURATION: 108 MS
EKG QTC CALCULATION (BAZETT): 442 MS
EKG R AXIS: 80 DEGREES
EKG T AXIS: 16 DEGREES
EKG VENTRICULAR RATE: 95 BPM
TROPONIN, HIGH SENSITIVITY: <6 NG/L (ref 0–11)

## 2021-09-18 PROCEDURE — 84484 ASSAY OF TROPONIN QUANT: CPT

## 2021-09-18 PROCEDURE — 93010 ELECTROCARDIOGRAM REPORT: CPT | Performed by: INTERNAL MEDICINE

## 2021-09-18 PROCEDURE — 85378 FIBRIN DEGRADE SEMIQUANT: CPT

## 2021-09-18 PROCEDURE — 6370000000 HC RX 637 (ALT 250 FOR IP): Performed by: STUDENT IN AN ORGANIZED HEALTH CARE EDUCATION/TRAINING PROGRAM

## 2021-09-18 RX ORDER — FAMOTIDINE 20 MG/1
20 TABLET, FILM COATED ORAL 2 TIMES DAILY
Qty: 20 TABLET | Refills: 0 | Status: SHIPPED | OUTPATIENT
Start: 2021-09-18 | End: 2022-09-22

## 2021-09-18 RX ADMIN — ASPIRIN 81 MG CHEWABLE TABLET 81 MG: 81 TABLET CHEWABLE at 00:24

## 2021-09-18 RX ADMIN — LIDOCAINE HYDROCHLORIDE: 20 SOLUTION ORAL; TOPICAL at 00:24

## 2021-09-18 ASSESSMENT — ENCOUNTER SYMPTOMS
EYE DISCHARGE: 0
BLOOD IN STOOL: 0
EYE ITCHING: 0
CHEST TIGHTNESS: 1
ABDOMINAL PAIN: 0
BACK PAIN: 0
SORE THROAT: 0
CONSTIPATION: 0
COUGH: 0
ABDOMINAL DISTENTION: 0
EYE PAIN: 0
SHORTNESS OF BREATH: 0
EYE REDNESS: 0
DIARRHEA: 0
NAUSEA: 0
FACIAL SWELLING: 0

## 2021-09-18 NOTE — ED PROVIDER NOTES
Name: Katelin Thayer   MRN: [id number]     --------------------------------------------- History of Present Illness ---------------------------------------------  9/17/21, Time: 11:48 PM EDT   Chief Complaint   Patient presents with    Chest Pain     Radiates Neck/Jaw and Back x3 Days    Shortness of Breath      HPI    Katelin Thayer is a 39 y.o. male, with hx of GERD, who presents to the ED today for chest pain, which began 3 days ago and has been constant and moderate in severity. The patient describes this as a tightness, squeezing feeling in his had some associated jaw pain. He states it is sometimes made worse with swallowing and he can sometimes feel it when he takes a deep breath. He states he gets some relief after burping. He took 2 aspirin today. He denies any cardiac history. He states he is a heavy coffee drinker. The pt denies any associated fever, HA, n/v, diaphoresis, lightheadedness, shortness of breath, abd pain, GI or  complaints}. Allg: Patient has no known allergies. PCP: Yulissa Llamas MD.    Meds: No current facility-administered medications for this encounter. Current Outpatient Medications:     famotidine (PEPCID) 20 MG tablet, Take 1 tablet by mouth 2 times daily for 10 days, Disp: 20 tablet, Rfl: 0    allopurinol (ZYLOPRIM) 100 MG tablet, Take 2 tablets by mouth daily, Disp: 180 tablet, Rfl: 3    allopurinol (ZYLOPRIM) 300 MG tablet, Take 1 tablet by mouth daily, Disp: 90 tablet, Rfl: 3     Review of Systems   Constitutional: Negative for chills and fever. HENT: Negative for facial swelling and sore throat. Eyes: Negative for pain, discharge, redness and itching. Respiratory: Positive for chest tightness. Negative for cough and shortness of breath. Cardiovascular: Positive for chest pain. Negative for palpitations. Gastrointestinal: Negative for abdominal distention, abdominal pain, blood in stool, constipation, diarrhea and nausea.    Endocrine: Negative for polyuria. Genitourinary: Negative for dysuria, flank pain, penile pain and testicular pain. Musculoskeletal: Negative for back pain and neck pain. Skin: Negative for pallor and rash. Neurological: Negative for syncope and headaches. Psychiatric/Behavioral: Negative for suicidal ideas. The patient is not nervous/anxious. Physical Exam  Vitals and nursing note reviewed. Constitutional:       General: He is not in acute distress. Appearance: He is normal weight. He is not ill-appearing, toxic-appearing or diaphoretic. HENT:      Head: Normocephalic and atraumatic. Right Ear: External ear normal.      Left Ear: External ear normal.      Nose: Nose normal.      Mouth/Throat:      Mouth: Mucous membranes are moist.      Pharynx: Oropharynx is clear. Eyes:      General: No scleral icterus. Pupils: Pupils are equal, round, and reactive to light. Cardiovascular:      Rate and Rhythm: Normal rate and regular rhythm. Pulses: No decreased pulses. Heart sounds: No murmur heard. No friction rub. No gallop. Pulmonary:      Effort: Pulmonary effort is normal. No respiratory distress. Breath sounds: Normal breath sounds. No stridor. No decreased breath sounds, wheezing, rhonchi or rales. Chest:      Chest wall: No deformity, tenderness, crepitus or edema. Abdominal:      Palpations: Abdomen is soft. Tenderness: There is no abdominal tenderness. There is no guarding or rebound. Musculoskeletal:         General: No tenderness or deformity. Normal range of motion. Cervical back: Normal range of motion. No tenderness. Right lower leg: No tenderness. No edema. Left lower leg: No tenderness. No edema. Skin:     General: Skin is warm and dry. Capillary Refill: Capillary refill takes less than 2 seconds. Findings: No erythema. Neurological:      General: No focal deficit present.       Mental Status: He is alert and oriented to person, place, [BB]      ED Course User Index  [BB] Avis Sage DO        ED Course as of Sep 18 0137   Sat Sep 18, 2021   0122 Received signout from Dr. Nixon Davis.    [BB]      ED Course User Index  [BB] Avis Sage DO       --------------------------------------------- PAST HISTORY ---------------------------------------------  Past Medical History:  has a past medical history of Gout. Past Surgical History:  has a past surgical history that includes lumbar fusion (1995); Vasectomy (2005); and Finger surgery (Left, 09/15/2017). Social History:  reports that he has quit smoking. His smoking use included cigarettes. He started smoking about 20 years ago. He quit after 10.00 years of use. He has quit using smokeless tobacco. He reports current alcohol use. He reports that he does not use drugs. Family History: family history includes Cancer in his maternal grandfather, maternal grandmother, maternal uncle, and paternal grandmother; Diabetes in his paternal aunt and paternal grandmother; Heart Disease in his father and paternal grandfather; Heart Disease (age of onset: 79) in his paternal aunt. The patients home medications have been reviewed. Allergies: Patient has no known allergies.     -------------------------------------------------- RESULTS -------------------------------------------------  Labs:  Results for orders placed or performed during the hospital encounter of 09/17/21   CBC Auto Differential   Result Value Ref Range    WBC 6.2 4.5 - 11.5 E9/L    RBC 4.08 3.80 - 5.80 E12/L    Hemoglobin 13.3 12.5 - 16.5 g/dL    Hematocrit 38.6 37.0 - 54.0 %    MCV 94.6 80.0 - 99.9 fL    MCH 32.6 26.0 - 35.0 pg    MCHC 34.5 32.0 - 34.5 %    RDW 12.8 11.5 - 15.0 fL    Platelets 062 637 - 131 E9/L    MPV 10.8 7.0 - 12.0 fL    Neutrophils % 63.4 43.0 - 80.0 %    Immature Granulocytes % 0.6 0.0 - 5.0 %    Lymphocytes % 25.3 20.0 - 42.0 %    Monocytes % 9.4 2.0 - 12.0 %    Eosinophils % 0.8 0.0 - 6.0 %    Basophils % 0.5 0.0 - 2.0 %    Neutrophils Absolute 3.91 1.80 - 7.30 E9/L    Immature Granulocytes # 0.04 E9/L    Lymphocytes Absolute 1.56 1.50 - 4.00 E9/L    Monocytes Absolute 0.58 0.10 - 0.95 E9/L    Eosinophils Absolute 0.05 0.05 - 0.50 E9/L    Basophils Absolute 0.03 0.00 - 0.20 E9/L   Comprehensive Metabolic Panel w/ Reflex to MG   Result Value Ref Range    Sodium 140 132 - 146 mmol/L    Potassium reflex Magnesium 3.8 3.5 - 5.0 mmol/L    Chloride 104 98 - 107 mmol/L    CO2 24 22 - 29 mmol/L    Anion Gap 12 7 - 16 mmol/L    Glucose 108 (H) 74 - 99 mg/dL    BUN 11 6 - 20 mg/dL    CREATININE 1.0 0.7 - 1.2 mg/dL    GFR Non-African American >60 >=60 mL/min/1.73    GFR African American >60     Calcium 9.4 8.6 - 10.2 mg/dL    Total Protein 7.4 6.4 - 8.3 g/dL    Albumin 4.3 3.5 - 5.2 g/dL    Total Bilirubin 0.4 0.0 - 1.2 mg/dL    Alkaline Phosphatase 81 40 - 129 U/L    ALT 15 0 - 40 U/L    AST 17 0 - 39 U/L   Troponin   Result Value Ref Range    Troponin, High Sensitivity <6 0 - 11 ng/L   Troponin   Result Value Ref Range    Troponin, High Sensitivity <6 0 - 11 ng/L   D-Dimer, Quantitative   Result Value Ref Range    D-Dimer, Quant <200 ng/mL DDU   EKG 12 Lead   Result Value Ref Range    Ventricular Rate 95 BPM    Atrial Rate 95 BPM    P-R Interval 164 ms    QRS Duration 108 ms    Q-T Interval 352 ms    QTc Calculation (Bazett) 442 ms    P Axis 52 degrees    R Axis 80 degrees    T Axis 16 degrees       Radiology:  XR CHEST (2 VW)   Final Result   Elevation of the right hemidiaphragm. No acute cardiopulmonary pathology. ------------------------- NURSING NOTES AND VITALS REVIEWED ---------------------------  Date / Time Roomed:  9/17/2021 11:26 PM  ED Bed Assignment:  22/22    The nursing notes within the ED encounter and vital signs as below have been reviewed.    /83   Pulse 74   Temp 97 °F (36.1 °C) (Temporal)   Resp 16   Ht 5' 9\" (1.753 m)   Wt 215 lb (97.5 kg)   SpO2 98%   BMI 31.75 kg/m²   Oxygen Saturation Interpretation: Normal      ------------------------------------------ PROGRESS NOTES ------------------------------------------  1:28 AM EDT  I have spoken with the patient and discussed todays results, in addition to providing specific details for the plan of care and counseling regarding the diagnosis and prognosis. Their questions are answered at this time and they are agreeable with the plan. I discussed at length with them reasons for immediate return here for re evaluation. They will followup with their PCP on Monday and to return to the ED for any new or worsening symptoms. --------------------------------- ADDITIONAL PROVIDER NOTES ---------------------------------  At this time the patient is without objective evidence of an acute process requiring hospitalization or inpatient management. They have remained hemodynamically stable throughout their entire ED visit and are stable for discharge with outpatient follow-up. The plan has been discussed in detail and they are aware of the specific conditions for emergent return, as well as the importance of follow-up. New Prescriptions    FAMOTIDINE (PEPCID) 20 MG TABLET    Take 1 tablet by mouth 2 times daily for 10 days       Diagnosis:  1. Atypical chest pain Improving   2. Dyspepsia Improving       Disposition:  Patient's disposition: Discharge to home  Patient's condition is stable.      Roro Murphy DO  Resident  09/18/21 2355

## 2021-10-19 DIAGNOSIS — Z12.11 ENCOUNTER FOR SCREENING COLONOSCOPY: Primary | ICD-10-CM

## 2022-02-14 ENCOUNTER — NURSE ONLY (OUTPATIENT)
Dept: PRIMARY CARE CLINIC | Age: 47
End: 2022-02-14

## 2022-02-14 DIAGNOSIS — Z20.822 CLOSE EXPOSURE TO COVID-19 VIRUS: Primary | ICD-10-CM

## 2022-02-14 DIAGNOSIS — Z20.822 CLOSE EXPOSURE TO COVID-19 VIRUS: ICD-10-CM

## 2022-02-15 LAB — SARS-COV-2, PCR: NOT DETECTED

## 2022-07-05 NOTE — PROGRESS NOTES
Tick exposure  Will treat with Doxycycline at his request for Lyme Prophylaxis. Doxycycline 200 mg x 1.   Called in to pharmacy

## 2022-09-22 ENCOUNTER — OFFICE VISIT (OUTPATIENT)
Dept: FAMILY MEDICINE CLINIC | Age: 47
End: 2022-09-22
Payer: COMMERCIAL

## 2022-09-22 VITALS
HEART RATE: 68 BPM | OXYGEN SATURATION: 98 % | TEMPERATURE: 97.2 F | BODY MASS INDEX: 31.18 KG/M2 | RESPIRATION RATE: 18 BRPM | DIASTOLIC BLOOD PRESSURE: 78 MMHG | HEIGHT: 69 IN | SYSTOLIC BLOOD PRESSURE: 124 MMHG | WEIGHT: 210.5 LBS

## 2022-09-22 DIAGNOSIS — Z00.00 ANNUAL PHYSICAL EXAM: Primary | ICD-10-CM

## 2022-09-22 DIAGNOSIS — M1A.00X0 IDIOPATHIC CHRONIC GOUT WITHOUT TOPHUS, UNSPECIFIED SITE: ICD-10-CM

## 2022-09-22 DIAGNOSIS — Z12.11 ENCOUNTER FOR SCREENING COLONOSCOPY: ICD-10-CM

## 2022-09-22 DIAGNOSIS — Z00.00 ANNUAL PHYSICAL EXAM: ICD-10-CM

## 2022-09-22 LAB
HCT VFR BLD CALC: 42.1 % (ref 37–54)
HEMOGLOBIN: 13.8 G/DL (ref 12.5–16.5)
MCH RBC QN AUTO: 32.8 PG (ref 26–35)
MCHC RBC AUTO-ENTMCNC: 32.8 % (ref 32–34.5)
MCV RBC AUTO: 100 FL (ref 80–99.9)
PDW BLD-RTO: 12.8 FL (ref 11.5–15)
PLATELET # BLD: 144 E9/L (ref 130–450)
PMV BLD AUTO: 11.2 FL (ref 7–12)
RBC # BLD: 4.21 E12/L (ref 3.8–5.8)
WBC # BLD: 5 E9/L (ref 4.5–11.5)

## 2022-09-22 PROCEDURE — 90471 IMMUNIZATION ADMIN: CPT | Performed by: FAMILY MEDICINE

## 2022-09-22 PROCEDURE — 90674 CCIIV4 VAC NO PRSV 0.5 ML IM: CPT | Performed by: FAMILY MEDICINE

## 2022-09-22 PROCEDURE — 99396 PREV VISIT EST AGE 40-64: CPT | Performed by: FAMILY MEDICINE

## 2022-09-22 RX ORDER — EPINEPHRINE 0.3 MG/.3ML
0.3 INJECTION SUBCUTANEOUS
Qty: 1 EACH | Refills: 1 | Status: SHIPPED | OUTPATIENT
Start: 2022-09-22 | End: 2022-09-22

## 2022-09-22 SDOH — ECONOMIC STABILITY: FOOD INSECURITY: WITHIN THE PAST 12 MONTHS, THE FOOD YOU BOUGHT JUST DIDN'T LAST AND YOU DIDN'T HAVE MONEY TO GET MORE.: NEVER TRUE

## 2022-09-22 SDOH — ECONOMIC STABILITY: FOOD INSECURITY: WITHIN THE PAST 12 MONTHS, YOU WORRIED THAT YOUR FOOD WOULD RUN OUT BEFORE YOU GOT MONEY TO BUY MORE.: NEVER TRUE

## 2022-09-22 ASSESSMENT — PATIENT HEALTH QUESTIONNAIRE - PHQ9
SUM OF ALL RESPONSES TO PHQ QUESTIONS 1-9: 0
SUM OF ALL RESPONSES TO PHQ9 QUESTIONS 1 & 2: 0
SUM OF ALL RESPONSES TO PHQ QUESTIONS 1-9: 0
SUM OF ALL RESPONSES TO PHQ QUESTIONS 1-9: 0
1. LITTLE INTEREST OR PLEASURE IN DOING THINGS: 0
2. FEELING DOWN, DEPRESSED OR HOPELESS: 0
SUM OF ALL RESPONSES TO PHQ QUESTIONS 1-9: 0

## 2022-09-22 ASSESSMENT — SOCIAL DETERMINANTS OF HEALTH (SDOH): HOW HARD IS IT FOR YOU TO PAY FOR THE VERY BASICS LIKE FOOD, HOUSING, MEDICAL CARE, AND HEATING?: NOT HARD AT ALL

## 2022-09-22 NOTE — PROGRESS NOTES
Annual exam:  Patient is here for routine yearly physical/preventative visit. Patient has complaints or concerns today. Pt would like to discuss possible allergy to avacados, bananas, and melons and cucumbers. Pt states he may need an epi pen. Pt would also like  To know he had covid in may with mild symptoms. Patient is  generally healthy. Chronic medical conditions are generally controlled. Most recent labs reviewed with patient and  are not remarkable. Health maintenance reviewed with patient and is not up to date. Overall doing well. Pt is fasting today. Last labs done 1 year ago. HM reviewed. Pt agreeable to flu vaccine. Pt is due for a colonoscopy and is agreeable to one.     Past Medical History:   Diagnosis Date    Gout       Past Surgical History:   Procedure Laterality Date    FINGER SURGERY Left 09/15/2017    left ring finger mass excision    LUMBAR FUSION      bone graft also--[facility] Dr. Nehemiah Tamayo        Family History   Problem Relation Age of Onset    Heart Disease Father         massive MI    Cancer Maternal Grandmother         breast and lung    Cancer Maternal Grandfather     Diabetes Paternal Grandmother     Cancer Paternal Grandmother         breast    Heart Disease Paternal Grandfather     Heart Disease Paternal Aunt 79         of massive MI    Diabetes Paternal Aunt     Cancer Maternal Uncle         colon     Social History     Socioeconomic History    Marital status:      Spouse name: Not on file    Number of children: Not on file    Years of education: Not on file    Highest education level: Not on file   Occupational History    Not on file   Tobacco Use    Smoking status: Former     Years: 10.00     Types: Cigarettes     Start date: 2000    Smokeless tobacco: Former   Substance and Sexual Activity    Alcohol use: Yes     Comment: 2-3 drinks daily    Drug use: No    Sexual activity: Not on file   Other Topics Concern    Not on file   Social History Narrative    Not on file     Social Determinants of Health     Financial Resource Strain: Low Risk     Difficulty of Paying Living Expenses: Not hard at all   Food Insecurity: No Food Insecurity    Worried About Running Out of Food in the Last Year: Never true    Ran Out of Food in the Last Year: Never true   Transportation Needs: Not on file   Physical Activity: Not on file   Stress: Not on file   Social Connections: Not on file   Intimate Partner Violence: Not on file   Housing Stability: Not on file      No Known Allergies     Review of Systems:  Constitutional:  No fever, no fatigue, no chills, no headaches, no weight change  Dermatology:  No rash, no mole, no dry or sensitive skin  ENT:  No cough, no sore throat, no sinus pain, no runny nose, no ear pain  Cardiology:  No chest pain, no palpitations, no leg edema, no shortness of breath, no PND  Endocrinology:  No polydipsia, no polyuria, no cold intolerance, no heat intolerance, no polyphagia, no hair changes  Gastroenterology:  No dysphagia, no abdominal pain, no nausea, no vomiting, no constipation, no diarrhea, no heartburn  Female Reproductive:  No hot flashes, no abnormal vaginal discharge, no pain with menstruation, no pelvic pain  Musculoskeletal:  No joint pain, no leg cramps, no back pain, no muscle aches  Respiratory:  No shortness of breath, no orthopnea, no wheezing, no SANCHEZ, no hemoptysis  Urology:  No blood in the urine, no urinary frequency, no urinary incontinence, no urinary urgency, no nocturia, no dysuria  Neurology:  No numbness/tingling, no dizziness, no weakness  Psychology:  No depression, no sleep disturbances, no suicidal ideation, no anxiety  Physical Exam:  Vitals:    09/22/22 1056 09/22/22 1058   BP: (!) 136/91 124/78   Pulse: 68    Resp: 18    Temp: 97.2 °F (36.2 °C)    TempSrc: Temporal    SpO2: 98%    Weight: 210 lb 8 oz (95.5 kg)    Height: 5' 9\" (1.753 m)      General:  Patient alert and oriented x 3, NAD, pleasant  HEENT:  Atraumatic, normocephalic, PERRLA, EOMI, clear conjunctiva, TMs clear, nose-clear, throat - no erythema, tonsils- wnl  Neck:  Supple, no goiter, no carotid bruits, no lymphadenopathy  Lungs:  CTA B  Heart:  RRR, no murmurs, gallops or rubs  Abdomen:  Soft, NTND, + bowel sounds  Back: full ROM, no CVA tenderness  Extremities:  No clubbing, cyanosis or edema  Neuro:  CN II-XII grossly intact, 5/5 strength in bilateral upper and lower extremities, 2 + reflexes. Skin: unremarkable    Assessment/Plan:  Koki Moralez was seen today for annual exam.    Diagnoses and all orders for this visit:    Annual physical exam  -     Comprehensive Metabolic Panel; Future  -     CBC; Future  -     Lipid Panel; Future  -     Hemoglobin A1C; Future    Idiopathic chronic gout without tophus, unspecified site  -     Uric Acid; Future    Encounter for screening colonoscopy  -     Prabhjot Nice MD, General Surgery, [place]    Other orders  -     Influenza, FLUCELVAX, (age 10 mo+), IM, Preservative Free, 0.5 mL  -     EPINEPHrine (EPIPEN 2-CHRISTELLE) 0.3 MG/0.3ML SOAJ injection; Inject 0.3 mLs into the muscle once as needed (allergic reaction)    As above. Call or go to ED immediately if symptoms worsen or persist.  No follow-ups on file. or sooner if necessary. Educational materials and/or home exercises printed for patient's review and were included in patient instructions on his/her After Visit Summary and given to patient at the end of visit. Counseled regarding above diagnosis, including possible risks and complications,  especially if left uncontrolled. Counseled regarding the possible side effects, risks, benefits and alternatives to treatment; patient and/or guardian verbalizes understanding, agrees, feels comfortable with and wishes to proceed with above treatment plan.     Advised patient to call with any new medication issues, and read all Rx info from pharmacy to assure aware of all possible risks and side effects of medication before taking. Reviewed age and gender appropriate health screening exams and vaccinations. Advised patient regarding importance of keeping up with recommended health maintenance and to schedule as soon as possible if overdue, as this is important in assessing for undiagnosed pathology, especially cancer, as well as protecting against potentially harmful/life threatening disease. Patient and/or guardian verbalizes understanding and agrees with above counseling, assessment and plan. All questions answered. Silva Brady MD  9/22/2022    I have personally reviewed and updated the chief complaint, HPI, Past Medical, Family and Social History, as well as the above Review of Systems.

## 2022-09-23 LAB
ALBUMIN SERPL-MCNC: 4.7 G/DL (ref 3.5–5.2)
ALP BLD-CCNC: 70 U/L (ref 40–129)
ALT SERPL-CCNC: 15 U/L (ref 0–40)
ANION GAP SERPL CALCULATED.3IONS-SCNC: 13 MMOL/L (ref 7–16)
AST SERPL-CCNC: 21 U/L (ref 0–39)
BILIRUB SERPL-MCNC: 0.5 MG/DL (ref 0–1.2)
BUN BLDV-MCNC: 12 MG/DL (ref 6–20)
CALCIUM SERPL-MCNC: 9.7 MG/DL (ref 8.6–10.2)
CHLORIDE BLD-SCNC: 102 MMOL/L (ref 98–107)
CHOLESTEROL, TOTAL: 246 MG/DL (ref 0–199)
CO2: 24 MMOL/L (ref 22–29)
CREAT SERPL-MCNC: 0.9 MG/DL (ref 0.7–1.2)
GFR AFRICAN AMERICAN: >60
GFR NON-AFRICAN AMERICAN: >60 ML/MIN/1.73
GLUCOSE BLD-MCNC: 92 MG/DL (ref 74–99)
HBA1C MFR BLD: 5.3 % (ref 4–5.6)
HDLC SERPL-MCNC: 54 MG/DL
LDL CHOLESTEROL CALCULATED: 147 MG/DL (ref 0–99)
POTASSIUM SERPL-SCNC: 4.5 MMOL/L (ref 3.5–5)
SODIUM BLD-SCNC: 139 MMOL/L (ref 132–146)
TOTAL PROTEIN: 7.8 G/DL (ref 6.4–8.3)
TRIGL SERPL-MCNC: 227 MG/DL (ref 0–149)
URIC ACID, SERUM: 8.5 MG/DL (ref 3.4–7)
VLDLC SERPL CALC-MCNC: 45 MG/DL

## 2022-09-26 DIAGNOSIS — M1A.00X0 IDIOPATHIC CHRONIC GOUT WITHOUT TOPHUS, UNSPECIFIED SITE: ICD-10-CM

## 2022-09-26 DIAGNOSIS — E78.1 HYPERTRIGLYCERIDEMIA: Primary | ICD-10-CM

## 2022-09-27 RX ORDER — ALLOPURINOL 100 MG/1
200 TABLET ORAL DAILY
Qty: 180 TABLET | Refills: 3 | Status: SHIPPED | OUTPATIENT
Start: 2022-09-27

## 2022-10-10 ENCOUNTER — TELEPHONE (OUTPATIENT)
Dept: SURGERY | Age: 47
End: 2022-10-10

## 2022-10-10 NOTE — TELEPHONE ENCOUNTER
FYI: cancelled 10/10/22 for screening colon consult/Dr Claudette Erichsen. Patient states will call back to reschedule. Nirmal Martins

## 2023-02-28 DIAGNOSIS — M1A.0790 IDIOPATHIC CHRONIC GOUT OF FOOT WITHOUT TOPHUS, UNSPECIFIED LATERALITY: Primary | ICD-10-CM

## 2023-02-28 RX ORDER — PREDNISONE 20 MG/1
TABLET ORAL
Qty: 10 TABLET | Refills: 0 | Status: SHIPPED | OUTPATIENT
Start: 2023-02-28

## 2023-02-28 NOTE — PROGRESS NOTES
Acute gout attack. Diagnoses and all orders for this visit:    Idiopathic chronic gout of foot without tophus, unspecified laterality  -     predniSONE (DELTASONE) 20 MG tablet; 20 mg tabs.   2 tabs PO QD X 3 DAYS, then 1 tabs PO QD X 3 DAYS, then 1/2 tab PO QD x 2 days

## 2023-11-14 ASSESSMENT — PATIENT HEALTH QUESTIONNAIRE - PHQ9
2. FEELING DOWN, DEPRESSED OR HOPELESS: NOT AT ALL
SUM OF ALL RESPONSES TO PHQ9 QUESTIONS 1 & 2: 0
2. FEELING DOWN, DEPRESSED OR HOPELESS: 0
SUM OF ALL RESPONSES TO PHQ QUESTIONS 1-9: 0
1. LITTLE INTEREST OR PLEASURE IN DOING THINGS: 0
SUM OF ALL RESPONSES TO PHQ QUESTIONS 1-9: 0
SUM OF ALL RESPONSES TO PHQ9 QUESTIONS 1 & 2: 0
1. LITTLE INTEREST OR PLEASURE IN DOING THINGS: NOT AT ALL

## 2023-11-17 ENCOUNTER — OFFICE VISIT (OUTPATIENT)
Dept: FAMILY MEDICINE CLINIC | Age: 48
End: 2023-11-17

## 2023-11-17 VITALS
TEMPERATURE: 97.5 F | WEIGHT: 217 LBS | BODY MASS INDEX: 32.14 KG/M2 | RESPIRATION RATE: 16 BRPM | HEART RATE: 81 BPM | SYSTOLIC BLOOD PRESSURE: 127 MMHG | DIASTOLIC BLOOD PRESSURE: 85 MMHG | OXYGEN SATURATION: 96 % | HEIGHT: 69 IN

## 2023-11-17 DIAGNOSIS — Z00.00 ANNUAL PHYSICAL EXAM: ICD-10-CM

## 2023-11-17 DIAGNOSIS — M1A.00X0 IDIOPATHIC CHRONIC GOUT WITHOUT TOPHUS, UNSPECIFIED SITE: ICD-10-CM

## 2023-11-17 DIAGNOSIS — Z00.00 ANNUAL PHYSICAL EXAM: Primary | ICD-10-CM

## 2023-11-17 DIAGNOSIS — Z12.11 SCREEN FOR COLON CANCER: ICD-10-CM

## 2023-11-17 LAB
ALBUMIN SERPL-MCNC: 4.7 G/DL (ref 3.5–5.2)
ALP BLD-CCNC: 74 U/L (ref 40–129)
ALT SERPL-CCNC: 14 U/L (ref 0–40)
ANION GAP SERPL CALCULATED.3IONS-SCNC: 18 MMOL/L (ref 7–16)
AST SERPL-CCNC: 21 U/L (ref 0–39)
BILIRUB SERPL-MCNC: 0.5 MG/DL (ref 0–1.2)
BUN BLDV-MCNC: 12 MG/DL (ref 6–20)
CALCIUM SERPL-MCNC: 9.5 MG/DL (ref 8.6–10.2)
CHLORIDE BLD-SCNC: 104 MMOL/L (ref 98–107)
CHOLESTEROL: 250 MG/DL
CO2: 22 MMOL/L (ref 22–29)
CREAT SERPL-MCNC: 0.9 MG/DL (ref 0.7–1.2)
GFR SERPL CREATININE-BSD FRML MDRD: >60 ML/MIN/1.73M2
GLUCOSE BLD-MCNC: 105 MG/DL (ref 74–99)
HDLC SERPL-MCNC: 57 MG/DL
LDL CHOLESTEROL: 152 MG/DL
POTASSIUM SERPL-SCNC: 4.8 MMOL/L (ref 3.5–5)
SODIUM BLD-SCNC: 144 MMOL/L (ref 132–146)
TOTAL PROTEIN: 7.8 G/DL (ref 6.4–8.3)
TRIGL SERPL-MCNC: 205 MG/DL
TSH SERPL DL<=0.05 MIU/L-ACNC: 2.12 UIU/ML (ref 0.27–4.2)
URIC ACID: 8.8 MG/DL (ref 3.4–7)
VLDLC SERPL CALC-MCNC: 41 MG/DL

## 2023-11-17 SDOH — ECONOMIC STABILITY: INCOME INSECURITY: HOW HARD IS IT FOR YOU TO PAY FOR THE VERY BASICS LIKE FOOD, HOUSING, MEDICAL CARE, AND HEATING?: NOT HARD AT ALL

## 2023-11-17 SDOH — ECONOMIC STABILITY: HOUSING INSECURITY
IN THE LAST 12 MONTHS, WAS THERE A TIME WHEN YOU DID NOT HAVE A STEADY PLACE TO SLEEP OR SLEPT IN A SHELTER (INCLUDING NOW)?: NO

## 2023-11-17 SDOH — ECONOMIC STABILITY: FOOD INSECURITY: WITHIN THE PAST 12 MONTHS, YOU WORRIED THAT YOUR FOOD WOULD RUN OUT BEFORE YOU GOT MONEY TO BUY MORE.: NEVER TRUE

## 2023-11-17 SDOH — ECONOMIC STABILITY: FOOD INSECURITY: WITHIN THE PAST 12 MONTHS, THE FOOD YOU BOUGHT JUST DIDN'T LAST AND YOU DIDN'T HAVE MONEY TO GET MORE.: NEVER TRUE

## 2023-11-18 LAB
HBA1C MFR BLD: 5.8 % (ref 4–5.6)
HCT VFR BLD CALC: 44.7 % (ref 37–54)
HEMOGLOBIN: 14.3 G/DL (ref 12.5–16.5)
MCH RBC QN AUTO: 32.2 PG (ref 26–35)
MCHC RBC AUTO-ENTMCNC: 32 G/DL (ref 32–34.5)
MCV RBC AUTO: 100.7 FL (ref 80–99.9)
PDW BLD-RTO: 13.1 % (ref 11.5–15)
PLATELET # BLD: 157 K/UL (ref 130–450)
PMV BLD AUTO: 11 FL (ref 7–12)
RBC # BLD: 4.44 M/UL (ref 3.8–5.8)
WBC # BLD: 6.7 K/UL (ref 4.5–11.5)

## 2023-11-22 RX ORDER — ALLOPURINOL 100 MG/1
200 TABLET ORAL DAILY
Qty: 180 TABLET | Refills: 3 | Status: SHIPPED | OUTPATIENT
Start: 2023-11-22

## 2024-05-14 ENCOUNTER — TELEPHONE (OUTPATIENT)
Dept: FAMILY MEDICINE CLINIC | Age: 49
End: 2024-05-14

## 2024-05-14 DIAGNOSIS — E78.1 HYPERTRIGLYCERIDEMIA: ICD-10-CM

## 2024-05-14 DIAGNOSIS — M1A.00X0 IDIOPATHIC CHRONIC GOUT WITHOUT TOPHUS, UNSPECIFIED SITE: Primary | ICD-10-CM

## 2024-05-14 DIAGNOSIS — R73.01 IMPAIRED FASTING GLUCOSE: ICD-10-CM

## 2024-06-06 DIAGNOSIS — M1A.00X0 IDIOPATHIC CHRONIC GOUT WITHOUT TOPHUS, UNSPECIFIED SITE: ICD-10-CM

## 2024-06-06 DIAGNOSIS — E78.1 HYPERTRIGLYCERIDEMIA: ICD-10-CM

## 2024-06-06 DIAGNOSIS — R73.01 IMPAIRED FASTING GLUCOSE: ICD-10-CM

## 2024-06-06 LAB
ALBUMIN: 4.7 G/DL (ref 3.5–5.2)
ALP BLD-CCNC: 62 U/L (ref 40–129)
ALT SERPL-CCNC: 19 U/L (ref 0–40)
ANION GAP SERPL CALCULATED.3IONS-SCNC: 15 MMOL/L (ref 7–16)
AST SERPL-CCNC: 22 U/L (ref 0–39)
BILIRUB SERPL-MCNC: 0.5 MG/DL (ref 0–1.2)
BUN BLDV-MCNC: 10 MG/DL (ref 6–20)
CALCIUM SERPL-MCNC: 9.5 MG/DL (ref 8.6–10.2)
CHLORIDE BLD-SCNC: 102 MMOL/L (ref 98–107)
CHOLESTEROL, TOTAL: 190 MG/DL
CO2: 23 MMOL/L (ref 22–29)
CREAT SERPL-MCNC: 0.8 MG/DL (ref 0.7–1.2)
GFR, ESTIMATED: >90 ML/MIN/1.73M2
GLUCOSE BLD-MCNC: 94 MG/DL (ref 74–99)
HBA1C MFR BLD: 4.9 % (ref 4–5.6)
HCT VFR BLD CALC: 43.4 % (ref 37–54)
HDLC SERPL-MCNC: 61 MG/DL
HEMOGLOBIN: 14.1 G/DL (ref 12.5–16.5)
LDL CHOLESTEROL: 116 MG/DL
MCH RBC QN AUTO: 32 PG (ref 26–35)
MCHC RBC AUTO-ENTMCNC: 32.5 G/DL (ref 32–34.5)
MCV RBC AUTO: 98.4 FL (ref 80–99.9)
PDW BLD-RTO: 13 % (ref 11.5–15)
PLATELET # BLD: 176 K/UL (ref 130–450)
PMV BLD AUTO: 11.1 FL (ref 7–12)
POTASSIUM SERPL-SCNC: 4.1 MMOL/L (ref 3.5–5)
RBC # BLD: 4.41 M/UL (ref 3.8–5.8)
SODIUM BLD-SCNC: 140 MMOL/L (ref 132–146)
TOTAL PROTEIN: 7.4 G/DL (ref 6.4–8.3)
TRIGL SERPL-MCNC: 66 MG/DL
TSH SERPL DL<=0.05 MIU/L-ACNC: 2.56 UIU/ML (ref 0.27–4.2)
URIC ACID: 7.1 MG/DL (ref 3.4–7)
VLDLC SERPL CALC-MCNC: 13 MG/DL
WBC # BLD: 5.1 K/UL (ref 4.5–11.5)

## 2024-06-12 ENCOUNTER — OFFICE VISIT (OUTPATIENT)
Dept: FAMILY MEDICINE CLINIC | Age: 49
End: 2024-06-12
Payer: COMMERCIAL

## 2024-06-12 VITALS
BODY MASS INDEX: 25.92 KG/M2 | WEIGHT: 175 LBS | RESPIRATION RATE: 16 BRPM | OXYGEN SATURATION: 98 % | HEIGHT: 69 IN | SYSTOLIC BLOOD PRESSURE: 112 MMHG | HEART RATE: 64 BPM | TEMPERATURE: 97.2 F | DIASTOLIC BLOOD PRESSURE: 70 MMHG

## 2024-06-12 DIAGNOSIS — E78.1 HYPERTRIGLYCERIDEMIA: ICD-10-CM

## 2024-06-12 DIAGNOSIS — M1A.00X0 IDIOPATHIC CHRONIC GOUT WITHOUT TOPHUS, UNSPECIFIED SITE: ICD-10-CM

## 2024-06-12 DIAGNOSIS — R73.01 IMPAIRED FASTING GLUCOSE: Primary | ICD-10-CM

## 2024-06-12 DIAGNOSIS — R63.4 RECENT WEIGHT LOSS: ICD-10-CM

## 2024-06-12 PROCEDURE — 99214 OFFICE O/P EST MOD 30 MIN: CPT | Performed by: FAMILY MEDICINE

## 2024-06-12 RX ORDER — EPINEPHRINE 0.3 MG/.3ML
0.3 INJECTION SUBCUTANEOUS
Qty: 1 EACH | Refills: 1 | Status: SHIPPED | OUTPATIENT
Start: 2024-06-12 | End: 2024-06-12

## 2024-06-12 ASSESSMENT — PATIENT HEALTH QUESTIONNAIRE - PHQ9
SUM OF ALL RESPONSES TO PHQ QUESTIONS 1-9: 0
SUM OF ALL RESPONSES TO PHQ9 QUESTIONS 1 & 2: 0
SUM OF ALL RESPONSES TO PHQ9 QUESTIONS 1 & 2: 0
1. LITTLE INTEREST OR PLEASURE IN DOING THINGS: NOT AT ALL
2. FEELING DOWN, DEPRESSED OR HOPELESS: NOT AT ALL
SUM OF ALL RESPONSES TO PHQ QUESTIONS 1-9: 0
1. LITTLE INTEREST OR PLEASURE IN DOING THINGS: NOT AT ALL
2. FEELING DOWN, DEPRESSED OR HOPELESS: NOT AT ALL
SUM OF ALL RESPONSES TO PHQ QUESTIONS 1-9: 0
SUM OF ALL RESPONSES TO PHQ QUESTIONS 1-9: 0

## 2024-06-12 NOTE — PROGRESS NOTES
Hyperlipidemia:  Patient is here to follow up regarding chronic hyperlipidemia.  This is  generally controlled.  Treatment includes diet.  Patient is  compliant with lifestyle modifications.  Patient is not a smoker.  Most recent labs reviewed with patient today and are not remarkable.  Comorbid conditions include gout.      Lab Results   Component Value Date     (H) 06/06/2024        Patient's past medical, surgical, social and/or family history reviewed, updated in chart, and are non-contributory (unless otherwise stated).  Medications and allergies also reviewed and updated in chart.      Review of Systems:  Constitutional:  No fever, no fatigue, no chills, no headaches, no weight change  Dermatology:  No rash, no mole, no dry or sensitive skin  ENT:  No cough, no sore throat, no sinus pain, no runny nose, no ear pain  Cardiology:  No chest pain, no palpitations, no leg edema, no shortness of breath, no PND  Gastroenterology:  No dysphagia, no abdominal pain, no nausea, no vomiting, no constipation, no diarrhea, no heartburn  Musculoskeletal:  No joint pain, no leg cramps, no back pain, no muscle aches  Respiratory:  No shortness of breath, no orthopnea, no wheezing, no SANCHEZ, no hemoptysis  Urology:  No blood in the urine, no urinary frequency, no urinary incontinence, no urinary urgency, no nocturia, no dysuria  Vitals:    06/12/24 0829   BP: 112/70   Pulse: 64   Resp: 16   Temp: 97.2 °F (36.2 °C)   SpO2: 98%   Weight: 79.4 kg (175 lb)   Height: 1.753 m (5' 9\")       General:  Patient alert and oriented x 3, NAD, pleasant  HEENT:  Atraumatic, normocephalic, PERRLA, EOMI, clear conjunctiva, TMs clear, nose-clear, throat - no erythema  Neck:  Supple, no goiter, no carotid bruits, no lymphadenopathy  Lungs:  CTA B  Heart:  RRR, no murmurs, gallops or rubs  Abdomen:  Soft/nt/nd, + bowel sounds  Extremities:  No clubbing, cyanosis or edema  Skin: unremarkable    Assessment/Plan:      Alex was seen today

## 2024-09-06 ENCOUNTER — OFFICE VISIT (OUTPATIENT)
Dept: SURGERY | Age: 49
End: 2024-09-06
Payer: COMMERCIAL

## 2024-09-06 ENCOUNTER — PREP FOR PROCEDURE (OUTPATIENT)
Dept: SURGERY | Age: 49
End: 2024-09-06

## 2024-09-06 ENCOUNTER — TELEPHONE (OUTPATIENT)
Dept: SURGERY | Age: 49
End: 2024-09-06

## 2024-09-06 VITALS
DIASTOLIC BLOOD PRESSURE: 76 MMHG | WEIGHT: 172 LBS | BODY MASS INDEX: 25.48 KG/M2 | HEIGHT: 69 IN | HEART RATE: 58 BPM | SYSTOLIC BLOOD PRESSURE: 126 MMHG | TEMPERATURE: 97.4 F

## 2024-09-06 DIAGNOSIS — Z12.11 COLON CANCER SCREENING: ICD-10-CM

## 2024-09-06 DIAGNOSIS — Z12.11 COLON CANCER SCREENING: Primary | ICD-10-CM

## 2024-09-06 PROCEDURE — 99202 OFFICE O/P NEW SF 15 MIN: CPT | Performed by: STUDENT IN AN ORGANIZED HEALTH CARE EDUCATION/TRAINING PROGRAM

## 2024-09-06 RX ORDER — BISACODYL 5 MG/1
10 TABLET, DELAYED RELEASE ORAL 2 TIMES DAILY
Qty: 4 TABLET | Refills: 0 | Status: SHIPPED | OUTPATIENT
Start: 2024-09-06 | End: 2024-09-07

## 2024-09-06 RX ORDER — SODIUM CHLORIDE 0.9 % (FLUSH) 0.9 %
5-40 SYRINGE (ML) INJECTION PRN
OUTPATIENT
Start: 2024-09-06

## 2024-09-06 RX ORDER — SODIUM CHLORIDE 9 MG/ML
INJECTION, SOLUTION INTRAVENOUS PRN
OUTPATIENT
Start: 2024-09-06

## 2024-09-06 RX ORDER — SODIUM CHLORIDE 9 MG/ML
INJECTION, SOLUTION INTRAVENOUS CONTINUOUS
OUTPATIENT
Start: 2024-09-06

## 2024-09-06 RX ORDER — POLYETHYLENE GLYCOL 3350, SODIUM SULFATE ANHYDROUS, SODIUM BICARBONATE, SODIUM CHLORIDE, POTASSIUM CHLORIDE 236; 22.74; 6.74; 5.86; 2.97 G/4L; G/4L; G/4L; G/4L; G/4L
4 POWDER, FOR SOLUTION ORAL ONCE
Qty: 4000 ML | Refills: 0 | Status: SHIPPED | OUTPATIENT
Start: 2024-09-06 | End: 2024-09-06

## 2024-09-06 RX ORDER — SODIUM CHLORIDE 0.9 % (FLUSH) 0.9 %
5-40 SYRINGE (ML) INJECTION EVERY 12 HOURS SCHEDULED
OUTPATIENT
Start: 2024-09-06

## 2024-09-06 NOTE — PROGRESS NOTES
[facility]  HISTORY & PHYSICAL    CC:  Colorectal cancer screening     HPI:     Alex Frye is here as New patient(9/6/2024). The patient was sent here to discuss colorectal cancer screening. The patient denies any weight loss, rectal bleeding, abdominal pain, or change in bowel habits.  There is no family history of IBD or colorectal cancer.  The patient has never had a screening colonoscopy.    Past Medical History:   Diagnosis Date    Gout 1998     Past Surgical History:   Procedure Laterality Date    FINGER SURGERY Left 09/15/2017    left ring finger mass excision    LUMBAR FUSION  1995    bone graft also--[facility] Dr. Gustafson    VASECTOMY  2005     Social History     Socioeconomic History    Marital status:      Spouse name: Not on file    Number of children: Not on file    Years of education: Not on file    Highest education level: Not on file   Occupational History    Not on file   Tobacco Use    Smoking status: Former     Types: Cigarettes     Start date: 9/27/2000    Smokeless tobacco: Former   Substance and Sexual Activity    Alcohol use: Yes     Comment: 2-3 drinks daily    Drug use: No    Sexual activity: Not on file   Other Topics Concern    Not on file   Social History Narrative    Not on file     Social Determinants of Health     Financial Resource Strain: Low Risk  (11/17/2023)    Overall Financial Resource Strain (CARDIA)     Difficulty of Paying Living Expenses: Not hard at all   Food Insecurity: Not on file (11/17/2023)   Transportation Needs: Unknown (11/17/2023)    PRAPARE - Transportation     Lack of Transportation (Medical): Not on file     Lack of Transportation (Non-Medical): No   Physical Activity: Not on file   Stress: Not on file   Social Connections: Not on file   Intimate Partner Violence: Not on file   Housing Stability: Unknown (11/17/2023)    Housing Stability Vital Sign     Unable to Pay for Housing in the Last Year: Not on file     Number of

## 2024-09-06 NOTE — PATIENT INSTRUCTIONS
IF YOU HAVE ANY QUESTIONS OR CONCERNS PLEASE CALL Nelly PIRES .[phone].    GOLYTELY OR NULYTELY  COLON PREP FOR COLONOSCOPY OR COLON SURGERY    It is very important that you follow all of the instructions listed on this sheet carefully (they may be slightly different than the directions on the product that you purchase at the pharmacy) to ensure that you colon is adequately cleaned out or you risk of complications could be increased.    2 DAYS OR MORE BEFORE ENDOSCOPY:  Obtain Golytely, Colyte or Nulytely, depending on the prescription the surgeon gave you, from the pharmacy.  Mix Golytely, Colyte or Nulytely according to instructions and refrigerate.  Do not eat corn, tomatoes, peas or watermelon 3 to 5 days before procedure.  If you are on INSULIN or OTHER DIABETIC MEDICATIONS, then check with your primary care physician as to how to adjust your medication while on clear liquid diet and when nothing by mouth.    1 DAY BEFORE THE ENDOSCOPY:    NO SOLID FOOD - ONLY CLEAR LIQUIDS (soup, jello, or juice that you can see through with no solid food) for breakfast, lunch and supper.   NOTHING RED OR PURPLE as it will turn the inside of the colon red and look like blood.   BEGIN DRINKING THE GOLYTELY, COLYTE, OR NULYTELY EARLY IN THE AFTERNOON. THE EARLIER THE BETTER. WE RECOMMEND BETWEEN 1:00PM AND 4:00PM Drink and 8oz glass of this solution every 10 minutes until you have drank the entire gallon. It is best to drink the whole glass rapidly rather than sipping small amounts continuously.  Bowel movements should occur about one hour after the first glass of Golytely, Colyte, or Nulytely. They will continue periodically for approximately 1-2 hours after you finish drinking the last glass. By this time the stool should be liquid and clear.  Feelings of bloating and/or nausea are common after the first few glasses because of the large volume of fluid ingested. This is temporary, however, and will improve once bowel

## 2024-09-06 NOTE — TELEPHONE ENCOUNTER
Prior Authorization Form:      DEMOGRAPHICS:                     Patient Name:  Alex Lemus  Patient :  1975            Insurance:  Payor: [employer] / Plan: [employer] [place] LOCAL / Product Type: *No Product type* /   Insurance ID Number:    Payer/Plan Subscr  Sex Relation Sub. Ins. ID Effective Group Num   1. [employer]* ALEX LEMUS 1975 Male Self [id number]* 3/16/22 [id number]                                    [id number]         DIAGNOSIS & PROCEDURE:                       Procedure/Operation: Colonoscopy           CPT Code: 48075    Diagnosis:  Screening     ICD10 Code: Z12.11    [place]  [facility] [place]    Surgeon:  Dr. Harley    SCHEDULING INFORMATION:                          Date: 10/04/24    Time: 1:30 PM              Anesthesia:  MAC/TIVA                                                       Status:  Outpatient        Electronically signed by Nelly Woodward MA on 2024 at 9:08 AM

## 2024-09-13 ENCOUNTER — TELEPHONE (OUTPATIENT)
Dept: SURGERY | Age: 49
End: 2024-09-13

## 2024-09-16 ENCOUNTER — TELEPHONE (OUTPATIENT)
Dept: SURGERY | Age: 49
End: 2024-09-16

## 2024-10-06 PROBLEM — Z12.11 COLON CANCER SCREENING: Status: RESOLVED | Noted: 2024-09-06 | Resolved: 2024-10-06

## 2024-11-05 NOTE — PROGRESS NOTES
[facility] PRE-ADMISSION TESTING   COLONOSCOPY INSTRUCTIONS  PAT- Phone Number: [phone]    COLONOSCOPY INSTRUCTIONS:     [x] Bowel Prep instructions reviewed - any questions regarding your prep, please contact surgeon's office.  [x] Colonoscopy: 1-2 days prior: Clear liquids only - nothing red or purple in color.  [x] Antibacterial Soap Shower Night before AND the morning of procedure.  [x] No solid food after midnight. You may have SIPS of clear liquids up until 2 hours before your arrival time to the hospital.   [x] Do not wear lotions, powders, deodorant.   [x] No tobacco products, illegal drugs, or alcohol within 24 hours of your surgery.  [x] Jewelry or valuables should not be brought to the hospital. All body and/or tongue piercing's must be removed prior to arriving to hospital. No contact lens or hair pins.   [x] Arrange transportation with a responsible adult  to and from the hospital. If you do not have a responsible adult  to transport you, you will need to make arrangements with a medical transportation company. Arrange for someone to be with you for the remainder of the day and for 24 hours after your procedure due to having had anesthesia.    -Who will be your  for transportation? wife  -Who will be staying with you for 24 hrs after your procedure? wife  [x] Bring insurance card and photo ID.  [x] Bring copy of living will or healthcare power of  papers to be placed in your electronic record.    PARKING INSTRUCTIONS:     [x] ARRIVAL DATE & TIME: 11/11 at 7:45 am  [x] Times are subject to change. We will contact you the business day before surgery if that were to occur.  [x] Enter into the [facility] Entrance. Two people may accompany you. Masks are not required.  [x] Parking Lot \"I\" is where you will park. It is located on the corner of [address] and [address]. The entrance is on [address].   Only one vehicle - per

## 2024-11-08 NOTE — PROGRESS NOTES
Patient called into PAT 11/8 stating he was sick. Needs to reschedule. Instructed to call surgeons' office.  Office aware.

## 2024-11-11 ENCOUNTER — ANESTHESIA (OUTPATIENT)
Dept: ENDOSCOPY | Age: 49
End: 2024-11-11
Payer: COMMERCIAL

## 2024-11-11 ENCOUNTER — ANESTHESIA EVENT (OUTPATIENT)
Dept: ENDOSCOPY | Age: 49
End: 2024-11-11
Payer: COMMERCIAL

## 2024-11-11 PROBLEM — Z12.11 COLON CANCER SCREENING: Status: ACTIVE | Noted: 2024-09-06

## 2024-12-11 PROBLEM — Z12.11 COLON CANCER SCREENING: Status: RESOLVED | Noted: 2024-09-06 | Resolved: 2024-12-11

## 2025-01-09 NOTE — PROGRESS NOTES
[facility] PRE-ADMISSION TESTING   COLONOSCOPY INSTRUCTIONS  PAT- Phone Number: [phone]    COLONOSCOPY INSTRUCTIONS:     [x] Bowel Prep instructions reviewed - any questions regarding your prep, please contact surgeon's office.  [x] Colonoscopy: 1-2 days prior: Clear liquids only - nothing red or purple in color.  [x] Antibacterial Soap Shower Night before AND the morning of procedure.  [x] No solid food after midnight. You may have SIPS of clear liquids up until 2 hours before your arrival time to the hospital.   [x] Do not wear makeup, lotions, powders, deodorant.   [x] No tobacco products, illegal drugs, or alcohol within 24 hours of your surgery.  [x] Jewelry or valuables should not be brought to the hospital. All body and/or tongue piercing's must be removed prior to arriving to hospital. No contact lens or hair pins.   [x] Arrange transportation with a responsible adult  to and from the hospital. If you do not have a responsible adult  to transport you, you will need to make arrangements with a medical transportation company. Arrange for someone to be with you for the remainder of the day and for 24 hours after your procedure due to having had anesthesia.    -Who will be your  for transportation? wife  -Who will be staying with you for 24 hrs after your procedure? wife  [x] Bring insurance card and photo ID.  [x] Bring copy of living will or healthcare power of  papers to be placed in your electronic record.    PARKING INSTRUCTIONS:     [x] ARRIVAL DATE & TIME: 1/13 at 7:45 am  [x] Times are subject to change. We will contact you the business day before surgery if that were to occur.  [x] Enter into the [facility] Entrance. Two people may accompany you. Masks are not required.  [x] Parking Lot \"I\" is where you will park. It is located on the corner of [address] and [address] [facility]. The entrance is on [address].   Only one vehicle -

## 2025-01-13 ENCOUNTER — HOSPITAL ENCOUNTER (OUTPATIENT)
Age: 50
Setting detail: OUTPATIENT SURGERY
Discharge: HOME | End: 2025-01-13
Attending: STUDENT IN AN ORGANIZED HEALTH CARE EDUCATION/TRAINING PROGRAM | Admitting: STUDENT IN AN ORGANIZED HEALTH CARE EDUCATION/TRAINING PROGRAM
Payer: COMMERCIAL

## 2025-01-13 VITALS
WEIGHT: 165 LBS | HEIGHT: 69 IN | BODY MASS INDEX: 24.44 KG/M2 | OXYGEN SATURATION: 100 % | TEMPERATURE: 97.8 F | RESPIRATION RATE: 18 BRPM | HEART RATE: 57 BPM | SYSTOLIC BLOOD PRESSURE: 137 MMHG | DIASTOLIC BLOOD PRESSURE: 86 MMHG

## 2025-01-13 PROBLEM — Z12.11 COLON CANCER SCREENING: Status: ACTIVE | Noted: 2024-09-06

## 2025-01-13 PROCEDURE — 2580000003 HC RX 258: Performed by: STUDENT IN AN ORGANIZED HEALTH CARE EDUCATION/TRAINING PROGRAM

## 2025-01-13 PROCEDURE — 2580000003 HC RX 258

## 2025-01-13 PROCEDURE — 2709999900 HC NON-CHARGEABLE SUPPLY: Performed by: STUDENT IN AN ORGANIZED HEALTH CARE EDUCATION/TRAINING PROGRAM

## 2025-01-13 PROCEDURE — 3700000001 HC ADD 15 MINUTES (ANESTHESIA): Performed by: STUDENT IN AN ORGANIZED HEALTH CARE EDUCATION/TRAINING PROGRAM

## 2025-01-13 PROCEDURE — 3700000000 HC ANESTHESIA ATTENDED CARE: Performed by: STUDENT IN AN ORGANIZED HEALTH CARE EDUCATION/TRAINING PROGRAM

## 2025-01-13 PROCEDURE — 6360000002 HC RX W HCPCS

## 2025-01-13 PROCEDURE — 7100000010 HC PHASE II RECOVERY - FIRST 15 MIN: Performed by: STUDENT IN AN ORGANIZED HEALTH CARE EDUCATION/TRAINING PROGRAM

## 2025-01-13 PROCEDURE — 45378 DIAGNOSTIC COLONOSCOPY: CPT | Performed by: STUDENT IN AN ORGANIZED HEALTH CARE EDUCATION/TRAINING PROGRAM

## 2025-01-13 PROCEDURE — 7100000011 HC PHASE II RECOVERY - ADDTL 15 MIN: Performed by: STUDENT IN AN ORGANIZED HEALTH CARE EDUCATION/TRAINING PROGRAM

## 2025-01-13 PROCEDURE — 3609027000 HC COLONOSCOPY: Performed by: STUDENT IN AN ORGANIZED HEALTH CARE EDUCATION/TRAINING PROGRAM

## 2025-01-13 RX ORDER — SODIUM CHLORIDE 9 MG/ML
INJECTION, SOLUTION INTRAVENOUS
Status: DISCONTINUED | OUTPATIENT
Start: 2025-01-13 | End: 2025-01-13 | Stop reason: SDUPTHER

## 2025-01-13 RX ORDER — PROPOFOL 10 MG/ML
INJECTION, EMULSION INTRAVENOUS
Status: DISCONTINUED | OUTPATIENT
Start: 2025-01-13 | End: 2025-01-13 | Stop reason: SDUPTHER

## 2025-01-13 RX ORDER — SODIUM CHLORIDE 0.9 % (FLUSH) 0.9 %
5-40 SYRINGE (ML) INJECTION EVERY 12 HOURS SCHEDULED
Status: DISCONTINUED | OUTPATIENT
Start: 2025-01-13 | End: 2025-01-13 | Stop reason: HOSPADM

## 2025-01-13 RX ORDER — SODIUM CHLORIDE 9 MG/ML
INJECTION, SOLUTION INTRAVENOUS CONTINUOUS
Status: DISCONTINUED | OUTPATIENT
Start: 2025-01-13 | End: 2025-01-13 | Stop reason: HOSPADM

## 2025-01-13 RX ORDER — SODIUM CHLORIDE 9 MG/ML
INJECTION, SOLUTION INTRAVENOUS PRN
Status: DISCONTINUED | OUTPATIENT
Start: 2025-01-13 | End: 2025-01-13 | Stop reason: HOSPADM

## 2025-01-13 RX ORDER — SODIUM CHLORIDE 0.9 % (FLUSH) 0.9 %
5-40 SYRINGE (ML) INJECTION PRN
Status: DISCONTINUED | OUTPATIENT
Start: 2025-01-13 | End: 2025-01-13 | Stop reason: HOSPADM

## 2025-01-13 RX ADMIN — PROPOFOL 480 MG: 10 INJECTION, EMULSION INTRAVENOUS at 09:07

## 2025-01-13 RX ADMIN — SODIUM CHLORIDE: 9 INJECTION, SOLUTION INTRAVENOUS at 08:20

## 2025-01-13 RX ADMIN — SODIUM CHLORIDE: 9 INJECTION, SOLUTION INTRAVENOUS at 07:52

## 2025-01-13 ASSESSMENT — PAIN - FUNCTIONAL ASSESSMENT: PAIN_FUNCTIONAL_ASSESSMENT: 0-10

## 2025-01-13 ASSESSMENT — LIFESTYLE VARIABLES: SMOKING_STATUS: 0

## 2025-01-13 NOTE — ANESTHESIA POSTPROCEDURE EVALUATION
Department of Anesthesiology  Postprocedure Note    Patient: Alex Frye  MRN: [id number]  YOB: 1975  Date of evaluation: 1/13/2025    Procedure Summary       Date: 01/13/25 Room / Location: [place] [address] / [facility]    Anesthesia Start: 0904 Anesthesia Stop: 0932    Procedure: COLORECTAL CANCER SCREENING, NOT HIGH RISK Diagnosis:       Colon cancer screening      (Colon cancer screening [Z12.11])    Surgeons: Jeffrey Harley MD Responsible Provider: Den Adair MD    Anesthesia Type: MAC ASA Status: 2            Anesthesia Type: No value filed.    Marcia Phase I: Marcia Score: 10    Marcia Phase II:      Anesthesia Post Evaluation    Patient location during evaluation: PACU  Patient participation: complete - patient participated  Level of consciousness: awake and alert  Pain score: 0  Airway patency: patent  Nausea & Vomiting: no nausea and no vomiting  Cardiovascular status: blood pressure returned to baseline  Respiratory status: acceptable  Hydration status: euvolemic        No notable events documented.

## 2025-01-13 NOTE — H&P
colonoscopy  I discussed the options for colorectal cancer screening with the patient including options of fecal occult blood testing with flexible sigmoidoscopy or air contrast barium enema.  I also discussed the risks and benefits of colonoscopy with possible biopsy/polypectomy/cauterization.   I recommended colonoscopy with deep sedation.  The patient understands the risks of bleeding and perforation (<0.1%) and agrees to proceed.     Schedule at [facility]  Endo orders placed   Bowel prep sent to pharmacy      Jeffrey Harley MD

## 2025-01-13 NOTE — ANESTHESIA PRE PROCEDURE
CREATININE 0.8 06/06/2024 03:05 PM    GFRAA >60 09/22/2022 12:00 PM    LABGLOM >90 06/06/2024 03:05 PM    LABGLOM >60 11/17/2023 09:17 AM    GLUCOSE 94 06/06/2024 03:05 PM    CALCIUM 9.5 06/06/2024 03:05 PM    BILITOT 0.5 06/06/2024 03:05 PM    ALKPHOS 62 06/06/2024 03:05 PM    AST 22 06/06/2024 03:05 PM    ALT 19 06/06/2024 03:05 PM       POC Tests: No results for input(s): \"POCGLU\", \"POCNA\", \"POCK\", \"POCCL\", \"POCBUN\", \"POCHEMO\", \"POCHCT\" in the last 72 hours.    Coags: No results found for: \"PROTIME\", \"INR\", \"APTT\"    HCG (If Applicable): No results found for: \"PREGTESTUR\", \"PREGSERUM\", \"HCG\", \"HCGQUANT\"     ABGs: No results found for: \"PHART\", \"PO2ART\", \"RLD3UJW\", \"NYU6FBU\", \"BEART\", \"W2JONDIA\"     Type & Screen (If Applicable):  No results found for: \"ABORH\", \"LABANTI\"    Drug/Infectious Status (If Applicable):  No results found for: \"HIV\", \"HEPCAB\"    COVID-19 Screening (If Applicable):   Lab Results   Component Value Date/Time    COVID19 Not Detected 02/14/2022 03:29 PM           Anesthesia Evaluation  Patient summary reviewed and Nursing notes reviewed   no history of anesthetic complications:   Airway: Mallampati: II  TM distance: >3 FB   Neck ROM: full  Mouth opening: > = 3 FB   Dental:          Pulmonary: breath sounds clear to auscultation      (-) sleep apnea and not a current smoker                           Cardiovascular:  Exercise tolerance: good (>4 METS)                    Neuro/Psych:   Negative Neuro/Psych ROS              GI/Hepatic/Renal: Neg GI/Hepatic/Renal ROS            Endo/Other: Negative Endo/Other ROS                    Abdominal:             Vascular: negative vascular ROS.         Other Findings:             Anesthesia Plan      MAC     ASA 2       Induction: intravenous.      Anesthetic plan and risks discussed with patient.    Use of blood products discussed with patient whom consented to blood products.    Plan discussed with attending.                    STAN Paige

## 2025-02-12 PROBLEM — Z12.11 COLON CANCER SCREENING: Status: RESOLVED | Noted: 2024-09-06 | Resolved: 2025-02-12

## 2025-08-05 ENCOUNTER — TELEPHONE (OUTPATIENT)
Dept: FAMILY MEDICINE CLINIC | Age: 50
End: 2025-08-05

## 2025-08-05 RX ORDER — PREDNISONE 20 MG/1
20 TABLET ORAL DAILY
Qty: 5 TABLET | Refills: 0 | Status: SHIPPED | OUTPATIENT
Start: 2025-08-05 | End: 2025-08-10

## 2025-08-21 ASSESSMENT — PATIENT HEALTH QUESTIONNAIRE - PHQ9
SUM OF ALL RESPONSES TO PHQ QUESTIONS 1-9: 2
2. FEELING DOWN, DEPRESSED OR HOPELESS: SEVERAL DAYS
1. LITTLE INTEREST OR PLEASURE IN DOING THINGS: SEVERAL DAYS
2. FEELING DOWN, DEPRESSED OR HOPELESS: SEVERAL DAYS
SUM OF ALL RESPONSES TO PHQ QUESTIONS 1-9: 2
SUM OF ALL RESPONSES TO PHQ QUESTIONS 1-9: 2
SUM OF ALL RESPONSES TO PHQ9 QUESTIONS 1 & 2: 2
SUM OF ALL RESPONSES TO PHQ QUESTIONS 1-9: 2
1. LITTLE INTEREST OR PLEASURE IN DOING THINGS: SEVERAL DAYS

## 2025-08-22 ENCOUNTER — OFFICE VISIT (OUTPATIENT)
Dept: FAMILY MEDICINE CLINIC | Age: 50
End: 2025-08-22
Payer: COMMERCIAL

## 2025-08-22 VITALS
RESPIRATION RATE: 16 BRPM | WEIGHT: 170.1 LBS | DIASTOLIC BLOOD PRESSURE: 78 MMHG | HEIGHT: 69 IN | SYSTOLIC BLOOD PRESSURE: 128 MMHG | BODY MASS INDEX: 25.2 KG/M2 | TEMPERATURE: 97.5 F | HEART RATE: 71 BPM

## 2025-08-22 DIAGNOSIS — Z00.00 ANNUAL PHYSICAL EXAM: Primary | ICD-10-CM

## 2025-08-22 DIAGNOSIS — M1A.00X0 IDIOPATHIC CHRONIC GOUT WITHOUT TOPHUS, UNSPECIFIED SITE: ICD-10-CM

## 2025-08-22 DIAGNOSIS — Z00.00 ANNUAL PHYSICAL EXAM: ICD-10-CM

## 2025-08-22 LAB
ALBUMIN: 4 G/DL (ref 3.5–5.2)
ALP BLD-CCNC: 53 U/L (ref 40–129)
ALT SERPL-CCNC: 12 U/L (ref 0–50)
ANION GAP SERPL CALCULATED.3IONS-SCNC: 12 MMOL/L (ref 7–16)
AST SERPL-CCNC: 25 U/L (ref 0–50)
BILIRUB SERPL-MCNC: 0.3 MG/DL (ref 0–1.2)
BUN BLDV-MCNC: 13 MG/DL (ref 6–20)
CALCIUM SERPL-MCNC: 9.1 MG/DL (ref 8.6–10)
CHLORIDE BLD-SCNC: 106 MMOL/L (ref 98–107)
CHOLESTEROL, TOTAL: 173 MG/DL
CO2: 25 MMOL/L (ref 22–29)
CREAT SERPL-MCNC: 0.8 MG/DL (ref 0.7–1.2)
GFR, ESTIMATED: >90 ML/MIN/1.73M2
GLUCOSE BLD-MCNC: 103 MG/DL (ref 74–99)
HBA1C MFR BLD: 5.3 % (ref 4–5.6)
HCT VFR BLD CALC: 40.1 % (ref 37–54)
HDLC SERPL-MCNC: 80 MG/DL
HEMOGLOBIN: 13.4 G/DL (ref 12.5–16.5)
LDL CHOLESTEROL: 81 MG/DL
MCH RBC QN AUTO: 33.6 PG (ref 26–35)
MCHC RBC AUTO-ENTMCNC: 33.4 G/DL (ref 32–34.5)
MCV RBC AUTO: 100.5 FL (ref 80–99.9)
PDW BLD-RTO: 12.1 % (ref 11.5–15)
PLATELET # BLD: 114 K/UL (ref 130–450)
PMV BLD AUTO: 10.4 FL (ref 7–12)
POTASSIUM SERPL-SCNC: 4 MMOL/L (ref 3.5–5.1)
RBC # BLD: 3.99 M/UL (ref 3.8–5.8)
SODIUM BLD-SCNC: 143 MMOL/L (ref 136–145)
TOTAL PROTEIN: 7.1 G/DL (ref 6.4–8.3)
TRIGL SERPL-MCNC: 60 MG/DL
TSH SERPL DL<=0.05 MIU/L-ACNC: 2.38 UIU/ML (ref 0.27–4.2)
URIC ACID: 5.7 MG/DL (ref 3.4–7)
VLDLC SERPL CALC-MCNC: 12 MG/DL
WBC # BLD: 2.9 K/UL (ref 4.5–11.5)

## 2025-08-22 PROCEDURE — 99396 PREV VISIT EST AGE 40-64: CPT | Performed by: FAMILY MEDICINE

## 2025-08-22 SDOH — ECONOMIC STABILITY: FOOD INSECURITY: WITHIN THE PAST 12 MONTHS, YOU WORRIED THAT YOUR FOOD WOULD RUN OUT BEFORE YOU GOT MONEY TO BUY MORE.: NEVER TRUE

## 2025-08-22 SDOH — ECONOMIC STABILITY: FOOD INSECURITY: WITHIN THE PAST 12 MONTHS, THE FOOD YOU BOUGHT JUST DIDN'T LAST AND YOU DIDN'T HAVE MONEY TO GET MORE.: NEVER TRUE

## 2025-08-22 ASSESSMENT — LIFESTYLE VARIABLES
HOW MANY STANDARD DRINKS CONTAINING ALCOHOL DO YOU HAVE ON A TYPICAL DAY: PATIENT DOES NOT DRINK
HOW OFTEN DO YOU HAVE A DRINK CONTAINING ALCOHOL: NEVER

## 2025-08-25 DIAGNOSIS — M1A.00X0 IDIOPATHIC CHRONIC GOUT WITHOUT TOPHUS, UNSPECIFIED SITE: Primary | ICD-10-CM

## (undated) DEVICE — GAUZE,SPONGE,4"X4",8PLY,STRL,LF,10/TRAY: Brand: MEDLINE

## (undated) DEVICE — CONNECTOR IRRIGATION AUXILIARY H2O JET W/ PRT MTL THRD HYDR

## (undated) DEVICE — AIR/WATER CLEANING ADAPTER FOR OLYMPUS® GI ENDOSCOPE: Brand: BULLDOG®

## (undated) DEVICE — ENDOSCOPIC KIT 1.1+ OP4 NO CP DE

## (undated) DEVICE — DEFENDO AIR WATER SUCTION AND BIOPSY VALVE KIT FOR  OLYMPUS: Brand: DEFENDO AIR/WATER/SUCTION AND BIOPSY VALVE